# Patient Record
Sex: FEMALE | Race: WHITE | NOT HISPANIC OR LATINO | Employment: OTHER | URBAN - METROPOLITAN AREA
[De-identification: names, ages, dates, MRNs, and addresses within clinical notes are randomized per-mention and may not be internally consistent; named-entity substitution may affect disease eponyms.]

---

## 2021-10-13 ENCOUNTER — HOSPITAL ENCOUNTER (EMERGENCY)
Facility: CLINIC | Age: 66
Discharge: HOME | End: 2021-10-14
Attending: EMERGENCY MEDICINE | Admitting: EMERGENCY MEDICINE
Payer: MEDICARE

## 2021-10-13 DIAGNOSIS — J20.9 ACUTE BRONCHITIS, UNSPECIFIED ORGANISM: ICD-10-CM

## 2021-10-13 DIAGNOSIS — E87.6 HYPOKALEMIA: ICD-10-CM

## 2021-10-13 DIAGNOSIS — I10 HYPERTENSION, UNSPECIFIED TYPE: ICD-10-CM

## 2021-10-13 PROCEDURE — 99285 EMERGENCY DEPT VISIT HI MDM: CPT | Mod: 25

## 2021-10-13 PROCEDURE — 96365 THER/PROPH/DIAG IV INF INIT: CPT

## 2021-10-13 PROCEDURE — 93005 ELECTROCARDIOGRAM TRACING: CPT

## 2021-10-13 PROCEDURE — 96361 HYDRATE IV INFUSION ADD-ON: CPT

## 2021-10-13 RX ORDER — SODIUM CHLORIDE 9 MG/ML
INJECTION, SOLUTION INTRAVENOUS CONTINUOUS
Status: DISCONTINUED | OUTPATIENT
Start: 2021-10-14 | End: 2021-10-14 | Stop reason: HOSPADM

## 2021-10-14 ENCOUNTER — APPOINTMENT (OUTPATIENT)
Dept: GENERAL RADIOLOGY | Facility: CLINIC | Age: 66
End: 2021-10-14
Attending: EMERGENCY MEDICINE
Payer: MEDICARE

## 2021-10-14 VITALS
HEART RATE: 85 BPM | DIASTOLIC BLOOD PRESSURE: 79 MMHG | OXYGEN SATURATION: 97 % | SYSTOLIC BLOOD PRESSURE: 196 MMHG | RESPIRATION RATE: 17 BRPM | TEMPERATURE: 97 F

## 2021-10-14 LAB
ANION GAP SERPL CALCULATED.3IONS-SCNC: 6 MMOL/L (ref 3–14)
ATRIAL RATE - MUSE: 83 BPM
BASOPHILS # BLD AUTO: 0 10E3/UL (ref 0–0.2)
BASOPHILS NFR BLD AUTO: 0 %
BUN SERPL-MCNC: 12 MG/DL (ref 7–30)
CALCIUM SERPL-MCNC: 8.9 MG/DL (ref 8.5–10.1)
CHLORIDE BLD-SCNC: 102 MMOL/L (ref 94–109)
CO2 SERPL-SCNC: 29 MMOL/L (ref 20–32)
CREAT SERPL-MCNC: 0.92 MG/DL (ref 0.52–1.04)
D DIMER PPP FEU-MCNC: 0.35 UG/ML FEU (ref 0–0.5)
DIASTOLIC BLOOD PRESSURE - MUSE: NORMAL MMHG
EOSINOPHIL # BLD AUTO: 0.1 10E3/UL (ref 0–0.7)
EOSINOPHIL NFR BLD AUTO: 1 %
ERYTHROCYTE [DISTWIDTH] IN BLOOD BY AUTOMATED COUNT: 11.9 % (ref 10–15)
GFR SERPL CREATININE-BSD FRML MDRD: 66 ML/MIN/1.73M2
GLUCOSE BLD-MCNC: 132 MG/DL (ref 70–99)
HCT VFR BLD AUTO: 39.5 % (ref 35–47)
HGB BLD-MCNC: 12.9 G/DL (ref 11.7–15.7)
IMM GRANULOCYTES # BLD: 0 10E3/UL
IMM GRANULOCYTES NFR BLD: 0 %
INTERPRETATION ECG - MUSE: NORMAL
LYMPHOCYTES # BLD AUTO: 1.7 10E3/UL (ref 0.8–5.3)
LYMPHOCYTES NFR BLD AUTO: 23 %
MCH RBC QN AUTO: 32.3 PG (ref 26.5–33)
MCHC RBC AUTO-ENTMCNC: 32.7 G/DL (ref 31.5–36.5)
MCV RBC AUTO: 99 FL (ref 78–100)
MONOCYTES # BLD AUTO: 0.7 10E3/UL (ref 0–1.3)
MONOCYTES NFR BLD AUTO: 9 %
NEUTROPHILS # BLD AUTO: 5.1 10E3/UL (ref 1.6–8.3)
NEUTROPHILS NFR BLD AUTO: 67 %
NRBC # BLD AUTO: 0 10E3/UL
NRBC BLD AUTO-RTO: 0 /100
P AXIS - MUSE: 50 DEGREES
PLATELET # BLD AUTO: 153 10E3/UL (ref 150–450)
POTASSIUM BLD-SCNC: 2.8 MMOL/L (ref 3.4–5.3)
PR INTERVAL - MUSE: 140 MS
QRS DURATION - MUSE: 88 MS
QT - MUSE: 376 MS
QTC - MUSE: 441 MS
R AXIS - MUSE: 64 DEGREES
RBC # BLD AUTO: 4 10E6/UL (ref 3.8–5.2)
SODIUM SERPL-SCNC: 137 MMOL/L (ref 133–144)
SYSTOLIC BLOOD PRESSURE - MUSE: NORMAL MMHG
T AXIS - MUSE: 40 DEGREES
TROPONIN I SERPL-MCNC: <0.015 UG/L (ref 0–0.04)
VENTRICULAR RATE- MUSE: 83 BPM
WBC # BLD AUTO: 7.7 10E3/UL (ref 4–11)

## 2021-10-14 PROCEDURE — 84484 ASSAY OF TROPONIN QUANT: CPT | Performed by: EMERGENCY MEDICINE

## 2021-10-14 PROCEDURE — 71045 X-RAY EXAM CHEST 1 VIEW: CPT

## 2021-10-14 PROCEDURE — 85025 COMPLETE CBC W/AUTO DIFF WBC: CPT | Performed by: EMERGENCY MEDICINE

## 2021-10-14 PROCEDURE — 258N000003 HC RX IP 258 OP 636: Performed by: EMERGENCY MEDICINE

## 2021-10-14 PROCEDURE — 250N000013 HC RX MED GY IP 250 OP 250 PS 637: Performed by: EMERGENCY MEDICINE

## 2021-10-14 PROCEDURE — 85379 FIBRIN DEGRADATION QUANT: CPT | Performed by: EMERGENCY MEDICINE

## 2021-10-14 PROCEDURE — 36415 COLL VENOUS BLD VENIPUNCTURE: CPT | Performed by: EMERGENCY MEDICINE

## 2021-10-14 PROCEDURE — 80048 BASIC METABOLIC PNL TOTAL CA: CPT | Performed by: EMERGENCY MEDICINE

## 2021-10-14 PROCEDURE — 250N000011 HC RX IP 250 OP 636: Performed by: EMERGENCY MEDICINE

## 2021-10-14 RX ORDER — POTASSIUM CHLORIDE 1500 MG/1
40 TABLET, EXTENDED RELEASE ORAL ONCE
Status: COMPLETED | OUTPATIENT
Start: 2021-10-14 | End: 2021-10-14

## 2021-10-14 RX ORDER — POTASSIUM CHLORIDE 7.45 MG/ML
10 INJECTION INTRAVENOUS ONCE
Status: COMPLETED | OUTPATIENT
Start: 2021-10-14 | End: 2021-10-14

## 2021-10-14 RX ORDER — POTASSIUM CHLORIDE 1500 MG/1
20 TABLET, EXTENDED RELEASE ORAL DAILY
Qty: 5 TABLET | Refills: 0 | Status: SHIPPED | OUTPATIENT
Start: 2021-10-14 | End: 2021-10-19

## 2021-10-14 RX ADMIN — POTASSIUM CHLORIDE 10 MEQ: 7.46 INJECTION, SOLUTION INTRAVENOUS at 01:00

## 2021-10-14 RX ADMIN — SODIUM CHLORIDE 1000 ML: 9 INJECTION, SOLUTION INTRAVENOUS at 00:15

## 2021-10-14 RX ADMIN — POTASSIUM CHLORIDE 40 MEQ: 1500 TABLET, EXTENDED RELEASE ORAL at 01:01

## 2021-10-14 ASSESSMENT — ENCOUNTER SYMPTOMS
NAUSEA: 0
VOMITING: 0
DIARRHEA: 0
COUGH: 1
SORE THROAT: 0
APPETITE CHANGE: 1
SHORTNESS OF BREATH: 0
FATIGUE: 1
MYALGIAS: 0
HEADACHES: 0

## 2021-10-14 NOTE — ED TRIAGE NOTES
Pt aox4, ABCs intact. Pt c/o cough, fatigue, and chills x5 days. Sent from urgent care for further work up. Pt also c/o chest pain and intermittent shortness of breath.

## 2021-10-14 NOTE — ED PROVIDER NOTES
"  History     Chief Complaint:  Fatigue and Cough      HPI   Lydia Johnson is a 65 year old female who presents with concerns for cough since last Tuesday that is intermittently productive and nonproductive, unknown sputum color, associated with initial chest pain only with coughing and now constant chest pain that she describes as \"irritated\".  She denies hemoptysis.  Denies shortness of breath aside from with coughing.  Denies leg swelling or pain.  She denies fever or chills.  She denies headache, sore throat, body aches. She notes she is fatigued than normal. Reports that she has had decreased oral intake but has been trying to drink plenty of fluids.  Denies nausea, vomiting or diarrhea.  She notes her granddaughter and her  were sick with similar symptoms.  She notes her  tested negative for Covid.  She had the Covid vaccine, last dose in March.  She notes she went to urgent care and they sent her here for further assessment.    Review of Systems   Constitutional: Positive for appetite change and fatigue.   HENT: Negative for sore throat.    Respiratory: Positive for cough. Negative for shortness of breath.    Cardiovascular: Positive for chest pain.   Gastrointestinal: Negative for diarrhea, nausea and vomiting.   Musculoskeletal: Negative for myalgias.   Neurological: Negative for headaches.   All other systems reviewed and are negative.      Allergies:  No Known Drug Allergies    Medications:    Tenormin  Hyzaar  Lipitor   Prilosec   Airduo    Past Medical History:    Hypertension     Past Surgical History:      Tubal ligation   Tonsillectomy    Family History:    Family history reviewed. No pertinent family history.     Social History:  The patient presents alone.     Physical Exam     Patient Vitals for the past 24 hrs:   BP Temp Temp src Pulse Resp SpO2   10/14/21 0119 -- -- -- 85 17 97 %   10/13/21 2027 (!) 196/79 97  F (36.1  C) Temporal 86 24 95 %       Physical " Exam  General: Elderly female sitting upright  Eyes: PERRL, Conjunctive within normal limits  ENT: Moist mucous membranes, oropharynx clear.   CV: Normal S1S2, no murmur, rub or gallop. Regular rate and rhythm  Resp: Clear to auscultation bilaterally, no wheezes, rales or rhonchi. Normal respiratory effort.  GI: Abdomen is soft, nontender and nondistended. No palpable masses. No rebound or guarding.  MSK: No edema. Nontender. Normal active range of motion.  Skin: Warm and dry. No rashes or lesions or ecchymoses on visible skin.  Neuro: Alert and oriented. Responds appropriately to all questions and commands. No focal findings appreciated. Normal muscle tone.  Psych: Normal mood and affect.     Emergency Department Course   ECG:  ECG taken at 2352, ECG read at 0001  Normal sinus rhythm   Nonspecific ST abnomality   Rate 83 bpm. NY interval 140 ms. QRS duration 88 ms. QT/QTc 376/441 ms. P-R-T axes 50 64 40.     Imaging:  XR Chest Port 1 View   Final Result   IMPRESSION: Heart size is normal. Lungs are clear. No visible pneumothorax or pleural effusion. Mild acromioclavicular degenerative spurring.          Laboratory:  Labs Ordered and Resulted from Time of ED Arrival Up to the Time of Departure from the ED   BASIC METABOLIC PANEL - Abnormal; Notable for the following components:       Result Value    Potassium 2.8 (*)     Glucose 132 (*)     All other components within normal limits   TROPONIN I - Normal   D DIMER QUANTITATIVE - Normal    Narrative:     This D-dimer assay is intended for use in conjunction with a clinical pretest probability assessment model to exclude pulmonary embolism (PE) and deep venous thrombosis (DVT) in outpatients suspected of PE or DVT. The cut-off value is 0.50 ug/mL FEU.   CBC WITH PLATELETS AND DIFFERENTIAL   COVID-19 VIRUS (CORONAVIRUS) BY PCR   PERIPHERAL IV CATHETER   CARDIAC CONTINUOUS MONITORING   PULSE OXIMETRY NURSING   CBC WITH PLATELETS & DIFFERENTIAL    Narrative:     The  following orders were created for panel order CBC with platelets differential.  Procedure                               Abnormality         Status                     ---------                               -----------         ------                     CBC with platelets and d...[[id number]]                      Final result                 Please view results for these tests on the individual orders.       Emergency Department Course:    Reviewed:  I reviewed nursing notes, vitals, past history and care everywhere    Assessments:  2345 I obtained history and examined the patient as noted above.     0126 I rechecked the patient and explained findings.     Interventions:  Medications   0.9% sodium chloride BOLUS (1,000 mLs Intravenous New Bag 10/14/21 0015)     Followed by   sodium chloride 0.9% infusion (has no administration in time range)   potassium chloride 10 mEq in 100 mL sterile water intermittent infusion (premix) (10 mEq Intravenous New Bag 10/14/21 0100)   potassium chloride ER (KLOR-CON M) CR tablet 40 mEq (40 mEq Oral Given 10/14/21 0101)       Disposition:  The patient was discharged to home.    Impression & Plan      Medical Decision Making:    Lydia Johnson is a 65 year old female who presents for evaluation of cough and fatigue.  There is no hypoxia. This is consistent by clinical exam with acute bronchitis. This is likely viral given the history and presentation as well as duration. She has no evidence of pneumonia by clinical examination or chest x-ray. She has no clinical evidence of ACS, PE, pneumothorax, pleural effusion,  etc. There is no indication antibiotics or hospitalization. She is overall well appearance. She did have incidentally noted hypokalemia which may be secondary to medications and poor oral intake. She was given both IV and p.o. supplementation. She was given IV fluids. She is feeling comfortable on reassessment. I discussed the plan which includes close follow-up with her  PCP within 3 to 5 days. She understands that if symptoms become prolonged or she develops new fever, shortness of breath, etc. she should get reassessed and/or return to the emergency department. Short course of potassium supplement prescribed, any further treatment as recommended on reassessment by PCP. She feels comfortable this plan is discharged home in stable condition.    Covid-19  Lydia Johnson was evaluated during a global COVID-19 pandemic, which necessitated consideration that the patient might be at risk for infection with the SARS-CoV-2 virus that causes COVID-19.   Applicable protocols for evaluation were followed during the patient's care.   COVID-19 was considered as part of the patient's evaluation. The plan for testing is:  a test was obtained during this visit.    Diagnosis:    ICD-10-CM    1. Acute bronchitis, unspecified organism  J20.9    2. Hypokalemia  E87.6        Scribe Disclosure:  I, Joanne Amaya, am serving as a scribe at 11:39 PM on 10/13/2021 to document services personally performed by Rula Noe MD based on my observations and the provider's statements to me.      Rula Noe MD  10/14/21 0147

## 2023-07-25 ENCOUNTER — HOSPITAL ENCOUNTER (EMERGENCY)
Facility: CLINIC | Age: 68
Discharge: HOME | End: 2023-07-25
Attending: EMERGENCY MEDICINE | Admitting: EMERGENCY MEDICINE
Payer: MEDICARE

## 2023-07-25 ENCOUNTER — APPOINTMENT (OUTPATIENT)
Dept: GENERAL RADIOLOGY | Facility: CLINIC | Age: 68
End: 2023-07-25
Attending: EMERGENCY MEDICINE
Payer: MEDICARE

## 2023-07-25 VITALS
WEIGHT: 163 LBS | DIASTOLIC BLOOD PRESSURE: 57 MMHG | SYSTOLIC BLOOD PRESSURE: 138 MMHG | BODY MASS INDEX: 25.58 KG/M2 | RESPIRATION RATE: 18 BRPM | TEMPERATURE: 97.2 F | HEART RATE: 40 BPM | HEIGHT: 67 IN | OXYGEN SATURATION: 95 %

## 2023-07-25 DIAGNOSIS — R07.89 CHEST WALL PAIN: ICD-10-CM

## 2023-07-25 DIAGNOSIS — R00.1 SYMPTOMATIC BRADYCARDIA: ICD-10-CM

## 2023-07-25 LAB
ANION GAP SERPL CALCULATED.3IONS-SCNC: 9 MMOL/L (ref 7–15)
ATRIAL RATE - MUSE: 48 BPM
BASOPHILS # BLD AUTO: 0.1 10E3/UL (ref 0–0.2)
BASOPHILS NFR BLD AUTO: 1 %
BUN SERPL-MCNC: 16.6 MG/DL (ref 8–23)
CALCIUM SERPL-MCNC: 9.7 MG/DL (ref 8.8–10.2)
CHLORIDE SERPL-SCNC: 102 MMOL/L (ref 98–107)
CREAT SERPL-MCNC: 0.97 MG/DL (ref 0.51–0.95)
D DIMER PPP FEU-MCNC: 0.3 UG/ML FEU (ref 0–0.5)
DEPRECATED HCO3 PLAS-SCNC: 29 MMOL/L (ref 22–29)
DIASTOLIC BLOOD PRESSURE - MUSE: NORMAL MMHG
EOSINOPHIL # BLD AUTO: 0.1 10E3/UL (ref 0–0.7)
EOSINOPHIL NFR BLD AUTO: 2 %
ERYTHROCYTE [DISTWIDTH] IN BLOOD BY AUTOMATED COUNT: 12.1 % (ref 10–15)
GFR SERPL CREATININE-BSD FRML MDRD: 64 ML/MIN/1.73M2
GLUCOSE SERPL-MCNC: 114 MG/DL (ref 70–99)
HCT VFR BLD AUTO: 42.8 % (ref 35–47)
HGB BLD-MCNC: 14.1 G/DL (ref 11.7–15.7)
HOLD SPECIMEN: NORMAL
HOLD SPECIMEN: NORMAL
IMM GRANULOCYTES # BLD: 0 10E3/UL
IMM GRANULOCYTES NFR BLD: 0 %
INTERPRETATION ECG - MUSE: NORMAL
LYMPHOCYTES # BLD AUTO: 1.8 10E3/UL (ref 0.8–5.3)
LYMPHOCYTES NFR BLD AUTO: 33 %
MAGNESIUM SERPL-MCNC: 1.9 MG/DL (ref 1.7–2.3)
MCH RBC QN AUTO: 32.2 PG (ref 26.5–33)
MCHC RBC AUTO-ENTMCNC: 32.9 G/DL (ref 31.5–36.5)
MCV RBC AUTO: 98 FL (ref 78–100)
MONOCYTES # BLD AUTO: 0.4 10E3/UL (ref 0–1.3)
MONOCYTES NFR BLD AUTO: 7 %
NEUTROPHILS # BLD AUTO: 3.1 10E3/UL (ref 1.6–8.3)
NEUTROPHILS NFR BLD AUTO: 57 %
NRBC # BLD AUTO: 0 10E3/UL
NRBC BLD AUTO-RTO: 0 /100
P AXIS - MUSE: 64 DEGREES
PLATELET # BLD AUTO: 166 10E3/UL (ref 150–450)
POTASSIUM SERPL-SCNC: 4.3 MMOL/L (ref 3.4–5.3)
PR INTERVAL - MUSE: 158 MS
QRS DURATION - MUSE: 84 MS
QT - MUSE: 428 MS
QTC - MUSE: 382 MS
R AXIS - MUSE: 75 DEGREES
RBC # BLD AUTO: 4.38 10E6/UL (ref 3.8–5.2)
SODIUM SERPL-SCNC: 140 MMOL/L (ref 136–145)
SYSTOLIC BLOOD PRESSURE - MUSE: NORMAL MMHG
T AXIS - MUSE: 52 DEGREES
TROPONIN T SERPL HS-MCNC: <6 NG/L
TROPONIN T SERPL HS-MCNC: <6 NG/L
TSH SERPL DL<=0.005 MIU/L-ACNC: 2.46 UIU/ML (ref 0.3–4.2)
VENTRICULAR RATE- MUSE: 48 BPM
WBC # BLD AUTO: 5.4 10E3/UL (ref 4–11)

## 2023-07-25 PROCEDURE — 84443 ASSAY THYROID STIM HORMONE: CPT | Performed by: EMERGENCY MEDICINE

## 2023-07-25 PROCEDURE — 71046 X-RAY EXAM CHEST 2 VIEWS: CPT

## 2023-07-25 PROCEDURE — 99285 EMERGENCY DEPT VISIT HI MDM: CPT | Mod: 25

## 2023-07-25 PROCEDURE — 96374 THER/PROPH/DIAG INJ IV PUSH: CPT

## 2023-07-25 PROCEDURE — 36415 COLL VENOUS BLD VENIPUNCTURE: CPT | Performed by: EMERGENCY MEDICINE

## 2023-07-25 PROCEDURE — 85379 FIBRIN DEGRADATION QUANT: CPT | Performed by: EMERGENCY MEDICINE

## 2023-07-25 PROCEDURE — 80048 BASIC METABOLIC PNL TOTAL CA: CPT | Performed by: EMERGENCY MEDICINE

## 2023-07-25 PROCEDURE — 93005 ELECTROCARDIOGRAM TRACING: CPT

## 2023-07-25 PROCEDURE — 83735 ASSAY OF MAGNESIUM: CPT | Performed by: EMERGENCY MEDICINE

## 2023-07-25 PROCEDURE — 85025 COMPLETE CBC W/AUTO DIFF WBC: CPT | Performed by: EMERGENCY MEDICINE

## 2023-07-25 PROCEDURE — 84484 ASSAY OF TROPONIN QUANT: CPT | Performed by: EMERGENCY MEDICINE

## 2023-07-25 PROCEDURE — 250N000011 HC RX IP 250 OP 636: Mod: JZ | Performed by: EMERGENCY MEDICINE

## 2023-07-25 RX ORDER — ATROPINE SULFATE 0.1 MG/ML
0.5 INJECTION INTRAVENOUS
Status: DISCONTINUED | OUTPATIENT
Start: 2023-07-25 | End: 2023-07-25 | Stop reason: HOSPADM

## 2023-07-25 RX ORDER — KETOROLAC TROMETHAMINE 15 MG/ML
15 INJECTION, SOLUTION INTRAMUSCULAR; INTRAVENOUS ONCE
Status: COMPLETED | OUTPATIENT
Start: 2023-07-25 | End: 2023-07-25

## 2023-07-25 RX ORDER — ONDANSETRON 2 MG/ML
4 INJECTION INTRAMUSCULAR; INTRAVENOUS ONCE
Status: DISCONTINUED | OUTPATIENT
Start: 2023-07-25 | End: 2023-07-25 | Stop reason: HOSPADM

## 2023-07-25 RX ADMIN — KETOROLAC TROMETHAMINE 15 MG: 15 INJECTION, SOLUTION INTRAMUSCULAR; INTRAVENOUS at 13:11

## 2023-07-25 ASSESSMENT — ACTIVITIES OF DAILY LIVING (ADL)
ADLS_ACUITY_SCORE: 35
ADLS_ACUITY_SCORE: 35

## 2023-07-25 NOTE — ED PROVIDER NOTES
History     Chief Complaint:  Chest Pain     The history is provided by the patient.      Lydia Johnson is a 67 year old female with a history of hypertension who presents with chest pain under her left breast which started 6 hours ago. She describes the pain as a pressure which is a 7-8/10 in the ED. He notes it woke her up out of her sleep. She has associated nausea and lightheadedness, restless upper arms, and dyspnea as well as shortness of breath when she went up the stairs. She denies vomiting, diaphoresis, leg swelling, back pain, or radiation of pain into arms or abdomen. No personal or family history of blood clots in legs or lungs. No history of thyroid problems. She notes she took 4 baby aspirins this morning. She notes she drinks alcohol but denies smoking. Family history of heart attack in father and sister. No recent medication changes. Per triage note, the patient had a recent 4 hour care ride.     Independent Historian:   None - Patient Only    Review of External Notes:   See MDM.      Medications:    Losartan-hydrochlorothiazide  Magnesium Glycinate  Atenolol  Atorvastatin  Estradiol    Past Medical History:    Adenomatous colon polyp  Postmenopausal hormone replacement therapy   Trophy of vulva  Symptomatic menopausal or female climacteric states   Allergic rhinitis  Essential hypertension   Pneumonia  Chronic cough   Pulmonary nodule  Mild asthma    Past Surgical History:     section  Tubal ligation  [place]er teeth extraction  Tonsillectomy      Physical Exam   Patient Vitals for the past 24 hrs:   BP Temp Pulse Resp SpO2 Height Weight   23 1231 -- -- (!) 40 12 98 % -- --   23 1222 (!) 148/59 -- (!) 41 28 97 % -- --   23 1207 (!) 141/68 -- (!) 41 18 97 % -- --   23 1156 -- -- (!) 40 -- 98 % -- --   23 1154 -- -- (!) 38 11 96 % -- --   23 1139 -- -- (!) 40 10 97 % -- --   23 1124 -- -- (!) 41 26 98 % -- --   23 1109 -- -- (!) 41 14 95 %  "-- --   07/25/23 1044 -- -- 50 21 99 % -- --   07/25/23 1037 (!) 178/72 -- (!) 41 10 97 % -- --   07/25/23 1022 (!) 173/64 -- (!) 40 20 98 % -- --   07/25/23 1002 (!) 189/75 -- (!) 44 19 98 % -- --   07/25/23 0939 (!) 207/70 97.2  F (36.2  C) (!) 48 16 96 % 1.702 m (5' 7\") 73.9 kg (163 lb)      Physical Exam  Constitutional: Well developed, nontox appearance  Head: Atraumatic.   Mouth/Throat: Oropharynx is clear and moist.Neck:  no stridor  Eyes: no scleral icterus  Cardiovascular: Regular bradycardia, 2+ bilat radial pulses  Pulmonary/Chest: nml resp effort, Clear BS bilat, equivocal chest tenderness  Abdominal: ND, soft, NT, no rebound or guarding   Ext: Warm, well perfused, no edema  Neurological: A&O, symmetric facies, moves ext x4  Skin: Skin is warm and dry.   Psychiatric: Behavior is normal. Thought content normal.   Nursing note and vitals reviewed.     Emergency Department Course   ECG  ECG taken at 0935, ECG read at 0935  Sinus bradycardia   No significant change as compared to prior, dated 10/13/2021.  Rate 48 bpm. LA interval 158 ms. QRS duration 84 ms. QT/QTc 428/382 ms. P-R-T axes 64 75 52.     Imaging:  XR Chest 2 Views   Final Result   IMPRESSION: PA and lateral views of the chest were obtained.   Cardiomediastinal silhouette is within normal limits. No suspicious   focal pulmonary opacities. No significant pleural effusion or   pneumothorax.      WISAM CHANDLER MD            SYSTEM ID:  [id number]         Report per radiology    Laboratory:  Labs Ordered and Resulted from Time of ED Arrival to Time of ED Departure   BASIC METABOLIC PANEL - Abnormal       Result Value    Sodium 140      Potassium 4.3      Chloride 102      Carbon Dioxide (CO2) 29      Anion Gap 9      Urea Nitrogen 16.6      Creatinine 0.97 (*)     Calcium 9.7      Glucose 114 (*)     GFR Estimate 64     TROPONIN T, HIGH SENSITIVITY - Normal    Troponin T, High Sensitivity <6     MAGNESIUM - Normal    Magnesium 1.9     TSH WITH " FREE T4 REFLEX - Normal    TSH 2.46     D DIMER QUANTITATIVE - Normal    D-Dimer Quantitative 0.30     TROPONIN T, HIGH SENSITIVITY - Normal    Troponin T, High Sensitivity <6     CBC WITH PLATELETS AND DIFFERENTIAL    WBC Count 5.4      RBC Count 4.38      Hemoglobin 14.1      Hematocrit 42.8      MCV 98      MCH 32.2      MCHC 32.9      RDW 12.1      Platelet Count 166      % Neutrophils 57      % Lymphocytes 33      % Monocytes 7      % Eosinophils 2      % Basophils 1      % Immature Granulocytes 0      NRBCs per 100 WBC 0      Absolute Neutrophils 3.1      Absolute Lymphocytes 1.8      Absolute Monocytes 0.4      Absolute Eosinophils 0.1      Absolute Basophils 0.1      Absolute Immature Granulocytes 0.0      Absolute NRBCs 0.0       Emergency Department Course & Assessments:    Interventions:  Medications   atropine injection 0.5 mg (has no administration in time range)   ondansetron (ZOFRAN) injection 4 mg (has no administration in time range)   ketorolac (TORADOL) injection 15 mg (15 mg Intravenous $Given 23 1311)      Independent Interpretation (X-rays, CTs, rhythm strip):  See MDM.     Assessments/Consultations/Discussion of Management or Tests:  ED Course as of 23 1314   Tue 2023   1010 I obtained the patient's history and examined as noted above.    1240 I rechecked the patient and explained findings.    1305 I rechecked the patient.      Social Determinants of Health affecting care:   See MDM.     Disposition:  The patient was discharged to home.     Impression & Plan    CMS Diagnoses: None      Medical Decision Makin year old female presenting w/ chest discomfort     Social determinants affecting patient's health include: Age potentially increasing risk for presentation to the emergency department     I reviewed medical records from family medicine office visit with heart rates in the 40s from 8/3/2022     DDx includes ACS, unstable angina, chest wall pain, GERD, pneumothorax,  pneumonia, symptomatic bradycardia, PE, some thyroid dysfunction.  EKG interpretation as noted above sig for no acute ischemic findings on my independent interpretation, sinus bradycardia noted.  Labs and imaging ordered as noted above.  Labs significant for troponin undetectable x2, D-dimer within normal limits, TSH within normal limits.  Imaging sig for no acute cardiopulmonary disease on my independent interpretation.  Patient was noted to be hypertensive early in her emergency department stay which gradually improved without intervention.  She reports overall feeling improved.  Patient remains bradycardic but this does not appear new after review of previous office visits.  She again notes that the pain is worse with deep breaths and movement.  Seems most consistent with chest wall pain such as a muscle strain or costochondritis.  Doubt ACS, PE, dissection therefore I do not feel further outpatient work-up is indicated at this time including a stress test.  Toradol given in the emergency department prior to discharge with recommendations given regarding symptom control at home.  At this time I feel the patient is safe for discharge.  Recommendations given regarding follow up with PCP and return to the emergency department as needed for new or worsening symptoms.  Pt counseled on all results, diagnosis and disposition.  They are understanding and agreeable to plan. Patient discharged in stable condition.    .    Diagnosis:    ICD-10-CM    1. Chest wall pain  R07.89       2. Symptomatic bradycardia  R00.1          Scribe Disclosure:  I, Makeda Macedo, am serving as a scribe at 9:57 AM on 7/25/2023 to document services personally performed by Eligio Gipson MD based on my observations and the provider's statements to me.      7/25/2023   Eligio Gipson MD Vaughn, Christopher E, MD  07/25/23 [id number]

## 2023-07-25 NOTE — DISCHARGE INSTRUCTIONS
Please return to the emergency department as needed for new or worsening symptoms such as fainting, severe shortness of breath, vomiting unable to keep anything down, any other concerning symptoms.

## 2023-07-25 NOTE — ED TRIAGE NOTES
Pt sent from  for chest pain left side that woke her up from sleep around 0400 this morning with SOB.  [place] No history of clots.     Triage Assessment     Row Name 07/25/23 0938       Triage Assessment (Adult)    Airway WDL WDL       Respiratory WDL    Respiratory WDL WDL       Skin Circulation/Temperature WDL    Skin Circulation/Temperature WDL WDL       Cardiac WDL    Cardiac WDL chest pain

## 2024-03-30 ENCOUNTER — APPOINTMENT (OUTPATIENT)
Dept: CARDIOLOGY | Facility: CLINIC | Age: 69
DRG: 193 | End: 2024-03-30
Attending: INTERNAL MEDICINE
Payer: MEDICARE

## 2024-03-30 ENCOUNTER — HOSPITAL ENCOUNTER (INPATIENT)
Facility: CLINIC | Age: 69
LOS: 6 days | Discharge: HOME | DRG: 193 | End: 2024-04-05
Attending: EMERGENCY MEDICINE | Admitting: INTERNAL MEDICINE
Payer: MEDICARE

## 2024-03-30 ENCOUNTER — APPOINTMENT (OUTPATIENT)
Dept: GENERAL RADIOLOGY | Facility: CLINIC | Age: 69
DRG: 193 | End: 2024-03-30
Attending: EMERGENCY MEDICINE
Payer: MEDICARE

## 2024-03-30 ENCOUNTER — APPOINTMENT (OUTPATIENT)
Dept: CT IMAGING | Facility: CLINIC | Age: 69
DRG: 193 | End: 2024-03-30
Attending: EMERGENCY MEDICINE
Payer: MEDICARE

## 2024-03-30 ENCOUNTER — APPOINTMENT (OUTPATIENT)
Dept: SPEECH THERAPY | Facility: CLINIC | Age: 69
DRG: 193 | End: 2024-03-30
Attending: HOSPITALIST
Payer: MEDICARE

## 2024-03-30 DIAGNOSIS — R06.09 DOE (DYSPNEA ON EXERTION): Primary | ICD-10-CM

## 2024-03-30 DIAGNOSIS — J44.9 CHRONIC OBSTRUCTIVE PULMONARY DISEASE, UNSPECIFIED COPD TYPE (H): ICD-10-CM

## 2024-03-30 DIAGNOSIS — R13.19 ESOPHAGEAL DYSPHAGIA: ICD-10-CM

## 2024-03-30 DIAGNOSIS — J18.9 UNRESOLVED PNEUMONIA: ICD-10-CM

## 2024-03-30 DIAGNOSIS — J96.01 ACUTE RESPIRATORY FAILURE WITH HYPOXIA (H): ICD-10-CM

## 2024-03-30 PROBLEM — E78.5 DYSLIPIDEMIA: Chronic | Status: ACTIVE | Noted: 2024-03-30

## 2024-03-30 PROBLEM — R09.02 HYPOXIA: Status: ACTIVE | Noted: 2024-03-30

## 2024-03-30 PROBLEM — D72.825 BANDEMIA: Status: ACTIVE | Noted: 2024-03-30

## 2024-03-30 LAB
ALBUMIN SERPL BCG-MCNC: 4 G/DL (ref 3.5–5.2)
ALP SERPL-CCNC: 96 U/L (ref 40–150)
ALT SERPL W P-5'-P-CCNC: 20 U/L (ref 0–50)
ANION GAP SERPL CALCULATED.3IONS-SCNC: 13 MMOL/L (ref 7–15)
AST SERPL W P-5'-P-CCNC: 21 U/L (ref 0–45)
BASOPHILS # BLD AUTO: 0.1 10E3/UL (ref 0–0.2)
BASOPHILS NFR BLD AUTO: 0 %
BILIRUB SERPL-MCNC: 0.9 MG/DL
BUN SERPL-MCNC: 21.9 MG/DL (ref 8–23)
CALCIUM SERPL-MCNC: 9.1 MG/DL (ref 8.8–10.2)
CHLORIDE SERPL-SCNC: 98 MMOL/L (ref 98–107)
CREAT SERPL-MCNC: 1.16 MG/DL (ref 0.51–0.95)
D DIMER PPP FEU-MCNC: 0.4 UG/ML FEU (ref 0–0.5)
DEPRECATED HCO3 PLAS-SCNC: 27 MMOL/L (ref 22–29)
EGFRCR SERPLBLD CKD-EPI 2021: 51 ML/MIN/1.73M2
EOSINOPHIL # BLD AUTO: 0.1 10E3/UL (ref 0–0.7)
EOSINOPHIL NFR BLD AUTO: 0 %
ERYTHROCYTE [DISTWIDTH] IN BLOOD BY AUTOMATED COUNT: 12.2 % (ref 10–15)
FLUAV RNA SPEC QL NAA+PROBE: NEGATIVE
FLUBV RNA RESP QL NAA+PROBE: NEGATIVE
GLUCOSE SERPL-MCNC: 119 MG/DL (ref 70–99)
HCT VFR BLD AUTO: 41.4 % (ref 35–47)
HGB BLD-MCNC: 13.7 G/DL (ref 11.7–15.7)
HOLD SPECIMEN: NORMAL
IMM GRANULOCYTES # BLD: 0.1 10E3/UL
IMM GRANULOCYTES NFR BLD: 1 %
LVEF ECHO: NORMAL
LYMPHOCYTES # BLD AUTO: 1.9 10E3/UL (ref 0.8–5.3)
LYMPHOCYTES NFR BLD AUTO: 10 %
MCH RBC QN AUTO: 31.4 PG (ref 26.5–33)
MCHC RBC AUTO-ENTMCNC: 33.1 G/DL (ref 31.5–36.5)
MCV RBC AUTO: 95 FL (ref 78–100)
MONOCYTES # BLD AUTO: 0.9 10E3/UL (ref 0–1.3)
MONOCYTES NFR BLD AUTO: 5 %
NEUTROPHILS # BLD AUTO: 16.7 10E3/UL (ref 1.6–8.3)
NEUTROPHILS NFR BLD AUTO: 84 %
NRBC # BLD AUTO: 0 10E3/UL
NRBC BLD AUTO-RTO: 0 /100
PLATELET # BLD AUTO: 165 10E3/UL (ref 150–450)
POTASSIUM SERPL-SCNC: 3.5 MMOL/L (ref 3.4–5.3)
PROT SERPL-MCNC: 7.1 G/DL (ref 6.4–8.3)
RBC # BLD AUTO: 4.36 10E6/UL (ref 3.8–5.2)
RSV RNA SPEC NAA+PROBE: NEGATIVE
SARS-COV-2 RNA RESP QL NAA+PROBE: NEGATIVE
SODIUM SERPL-SCNC: 138 MMOL/L (ref 135–145)
TROPONIN T SERPL HS-MCNC: 6 NG/L
WBC # BLD AUTO: 19.8 10E3/UL (ref 4–11)

## 2024-03-30 PROCEDURE — 99223 1ST HOSP IP/OBS HIGH 75: CPT | Mod: AI | Performed by: INTERNAL MEDICINE

## 2024-03-30 PROCEDURE — 80053 COMPREHEN METABOLIC PANEL: CPT | Performed by: EMERGENCY MEDICINE

## 2024-03-30 PROCEDURE — 99285 EMERGENCY DEPT VISIT HI MDM: CPT | Mod: 25

## 2024-03-30 PROCEDURE — 85025 COMPLETE CBC W/AUTO DIFF WBC: CPT | Performed by: EMERGENCY MEDICINE

## 2024-03-30 PROCEDURE — 93005 ELECTROCARDIOGRAM TRACING: CPT

## 2024-03-30 PROCEDURE — 71250 CT THORAX DX C-: CPT | Mod: MG

## 2024-03-30 PROCEDURE — 250N000011 HC RX IP 250 OP 636: Performed by: EMERGENCY MEDICINE

## 2024-03-30 PROCEDURE — 93306 TTE W/DOPPLER COMPLETE: CPT | Mod: 26 | Performed by: INTERNAL MEDICINE

## 2024-03-30 PROCEDURE — 87637 SARSCOV2&INF A&B&RSV AMP PRB: CPT | Performed by: EMERGENCY MEDICINE

## 2024-03-30 PROCEDURE — 96365 THER/PROPH/DIAG IV INF INIT: CPT

## 2024-03-30 PROCEDURE — 250N000011 HC RX IP 250 OP 636: Performed by: INTERNAL MEDICINE

## 2024-03-30 PROCEDURE — 36415 COLL VENOUS BLD VENIPUNCTURE: CPT | Performed by: EMERGENCY MEDICINE

## 2024-03-30 PROCEDURE — 120N000001 HC R&B MED SURG/OB

## 2024-03-30 PROCEDURE — 92610 EVALUATE SWALLOWING FUNCTION: CPT | Mod: GN

## 2024-03-30 PROCEDURE — 85379 FIBRIN DEGRADATION QUANT: CPT | Performed by: EMERGENCY MEDICINE

## 2024-03-30 PROCEDURE — 71046 X-RAY EXAM CHEST 2 VIEWS: CPT

## 2024-03-30 PROCEDURE — 258N000003 HC RX IP 258 OP 636: Performed by: EMERGENCY MEDICINE

## 2024-03-30 PROCEDURE — 84484 ASSAY OF TROPONIN QUANT: CPT | Performed by: EMERGENCY MEDICINE

## 2024-03-30 PROCEDURE — 93306 TTE W/DOPPLER COMPLETE: CPT

## 2024-03-30 RX ORDER — ACETAMINOPHEN 325 MG/1
650 TABLET ORAL EVERY 4 HOURS PRN
Status: DISCONTINUED | OUTPATIENT
Start: 2024-03-30 | End: 2024-04-05 | Stop reason: HOSPADM

## 2024-03-30 RX ORDER — IPRATROPIUM BROMIDE AND ALBUTEROL SULFATE 2.5; .5 MG/3ML; MG/3ML
3 SOLUTION RESPIRATORY (INHALATION) 4 TIMES DAILY PRN
Status: DISCONTINUED | OUTPATIENT
Start: 2024-03-30 | End: 2024-04-05 | Stop reason: HOSPADM

## 2024-03-30 RX ORDER — AMOXICILLIN 250 MG
1 CAPSULE ORAL 2 TIMES DAILY PRN
Status: DISCONTINUED | OUTPATIENT
Start: 2024-03-30 | End: 2024-04-05 | Stop reason: HOSPADM

## 2024-03-30 RX ORDER — PROCHLORPERAZINE 25 MG
12.5 SUPPOSITORY, RECTAL RECTAL EVERY 12 HOURS PRN
Status: DISCONTINUED | OUTPATIENT
Start: 2024-03-30 | End: 2024-04-05 | Stop reason: HOSPADM

## 2024-03-30 RX ORDER — ATORVASTATIN CALCIUM 10 MG/1
10 TABLET, FILM COATED ORAL DAILY
COMMUNITY

## 2024-03-30 RX ORDER — ATENOLOL 25 MG/1
25 TABLET ORAL 2 TIMES DAILY
COMMUNITY

## 2024-03-30 RX ORDER — LOSARTAN POTASSIUM AND HYDROCHLOROTHIAZIDE 12.5; 5 MG/1; MG/1
1 TABLET ORAL DAILY
COMMUNITY

## 2024-03-30 RX ORDER — LIDOCAINE 40 MG/G
CREAM TOPICAL
Status: DISCONTINUED | OUTPATIENT
Start: 2024-03-30 | End: 2024-04-05 | Stop reason: HOSPADM

## 2024-03-30 RX ORDER — FLUTICASONE PROPIONATE AND SALMETEROL 113; 14 UG/1; UG/1
1 POWDER, METERED RESPIRATORY (INHALATION) 2 TIMES DAILY
COMMUNITY

## 2024-03-30 RX ORDER — AMOXICILLIN 250 MG
2 CAPSULE ORAL 2 TIMES DAILY PRN
Status: DISCONTINUED | OUTPATIENT
Start: 2024-03-30 | End: 2024-04-05 | Stop reason: HOSPADM

## 2024-03-30 RX ORDER — CEFTRIAXONE 1 G/1
1 INJECTION, POWDER, FOR SOLUTION INTRAMUSCULAR; INTRAVENOUS ONCE
Status: COMPLETED | OUTPATIENT
Start: 2024-03-30 | End: 2024-03-30

## 2024-03-30 RX ORDER — FLUTICASONE PROPIONATE 50 MCG
2 SPRAY, SUSPENSION (ML) NASAL DAILY
COMMUNITY

## 2024-03-30 RX ORDER — AZITHROMYCIN 500 MG/5ML
500 INJECTION, POWDER, LYOPHILIZED, FOR SOLUTION INTRAVENOUS ONCE
Status: COMPLETED | OUTPATIENT
Start: 2024-03-30 | End: 2024-03-30

## 2024-03-30 RX ORDER — AZITHROMYCIN 500 MG/5ML
500 INJECTION, POWDER, LYOPHILIZED, FOR SOLUTION INTRAVENOUS EVERY 24 HOURS
Status: DISCONTINUED | OUTPATIENT
Start: 2024-03-30 | End: 2024-03-30

## 2024-03-30 RX ORDER — PROCHLORPERAZINE MALEATE 5 MG
5 TABLET ORAL EVERY 6 HOURS PRN
Status: DISCONTINUED | OUTPATIENT
Start: 2024-03-30 | End: 2024-04-05 | Stop reason: HOSPADM

## 2024-03-30 RX ORDER — CALCIUM CARBONATE 500 MG/1
1000 TABLET, CHEWABLE ORAL 4 TIMES DAILY PRN
Status: DISCONTINUED | OUTPATIENT
Start: 2024-03-30 | End: 2024-04-05 | Stop reason: HOSPADM

## 2024-03-30 RX ORDER — ACETAMINOPHEN 650 MG/1
650 SUPPOSITORY RECTAL EVERY 4 HOURS PRN
Status: DISCONTINUED | OUTPATIENT
Start: 2024-03-30 | End: 2024-04-05 | Stop reason: HOSPADM

## 2024-03-30 RX ORDER — ONDANSETRON 4 MG/1
4 TABLET, ORALLY DISINTEGRATING ORAL EVERY 6 HOURS PRN
Status: DISCONTINUED | OUTPATIENT
Start: 2024-03-30 | End: 2024-04-05 | Stop reason: HOSPADM

## 2024-03-30 RX ORDER — CEFTRIAXONE 2 G/1
2 INJECTION, POWDER, FOR SOLUTION INTRAMUSCULAR; INTRAVENOUS EVERY 24 HOURS
Status: DISCONTINUED | OUTPATIENT
Start: 2024-03-31 | End: 2024-04-04

## 2024-03-30 RX ORDER — AZITHROMYCIN 250 MG/1
250 TABLET, FILM COATED ORAL DAILY
Status: COMPLETED | OUTPATIENT
Start: 2024-03-31 | End: 2024-04-03

## 2024-03-30 RX ORDER — ENOXAPARIN SODIUM 100 MG/ML
40 INJECTION SUBCUTANEOUS EVERY 24 HOURS
Status: DISCONTINUED | OUTPATIENT
Start: 2024-03-30 | End: 2024-04-05 | Stop reason: HOSPADM

## 2024-03-30 RX ORDER — ONDANSETRON 2 MG/ML
4 INJECTION INTRAMUSCULAR; INTRAVENOUS EVERY 6 HOURS PRN
Status: DISCONTINUED | OUTPATIENT
Start: 2024-03-30 | End: 2024-04-05 | Stop reason: HOSPADM

## 2024-03-30 RX ADMIN — AZITHROMYCIN MONOHYDRATE 500 MG: 500 INJECTION, POWDER, LYOPHILIZED, FOR SOLUTION INTRAVENOUS at 06:33

## 2024-03-30 RX ADMIN — CEFTRIAXONE 1 G: 1 INJECTION, POWDER, FOR SOLUTION INTRAMUSCULAR; INTRAVENOUS at 05:49

## 2024-03-30 RX ADMIN — ENOXAPARIN SODIUM 40 MG: 40 INJECTION SUBCUTANEOUS at 08:06

## 2024-03-30 ASSESSMENT — ACTIVITIES OF DAILY LIVING (ADL)
ADLS_ACUITY_SCORE: 35
ADLS_ACUITY_SCORE: 35
ADLS_ACUITY_SCORE: 22
ADLS_ACUITY_SCORE: 35
ADLS_ACUITY_SCORE: 22
ADLS_ACUITY_SCORE: 35
ADLS_ACUITY_SCORE: 22
ADLS_ACUITY_SCORE: 35
ADLS_ACUITY_SCORE: 22
ADLS_ACUITY_SCORE: 35
ADLS_ACUITY_SCORE: 22
ADLS_ACUITY_SCORE: 35

## 2024-03-30 ASSESSMENT — COLUMBIA-SUICIDE SEVERITY RATING SCALE - C-SSRS
6. HAVE YOU EVER DONE ANYTHING, STARTED TO DO ANYTHING, OR PREPARED TO DO ANYTHING TO END YOUR LIFE?: NO
2. HAVE YOU ACTUALLY HAD ANY THOUGHTS OF KILLING YOURSELF IN THE PAST MONTH?: NO
1. IN THE PAST MONTH, HAVE YOU WISHED YOU WERE DEAD OR WISHED YOU COULD GO TO SLEEP AND NOT WAKE UP?: NO

## 2024-03-30 NOTE — H&P
[facility]    History and Physical - Hospitalist Service       Date of Admission:  3/30/2024    Assessment & Plan      Lydia Johnson is a 68 year old female admitted on 3/30/2024. She has a past medical history of chronic cough, osteopenia, hypertension, dyslipidemia.    Patient presented with hypoxia, leukocytosis and was noted to have evidence of bilateral pulmonary infiltrates on noncontrast CT chest.  1/.  Acute hypoxemic respiratory failure: Likely related to community-acquired pneumonia: Supplemental oxygen, respiratory therapy evaluation, as needed DuoNebs.  2/.  Community-acquired pneumonia patient will be administered azithromycin and ceftriaxone on an ongoing basis.  3/.  Chronic cough and ongoing issues with her respiratory problems, not unreasonable to obtain a cardiac echogram which has been ordered.  /.  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  Anticipate discharge upon improvement in oxygen requirement.  As mentioned patient is well-established with pulmonary medicine and her healthcare system        Diet:  regular diet  DVT Prophylaxis: Enoxaparin (Lovenox) SQ  Huerta Catheter: Not present  Lines: None     Cardiac Monitoring: None  Code Status:  Full    Clinically Significant Risk Factors Present on Admission                    # Acute Respiratory Failure: Documented O2 saturation < 91%.  Continue supplemental oxygen as needed                Disposition Plan    1-2 days       Samantha Peralta MD  Hospitalist Service  [facility]  Securely message with [employer] (more info)  Text page via [employer] Paging/Directory     ______________________________________________________________________    Chief Complaint   Shortness of breath    History is obtained from the patient, electronic health record, and emergency department physician    History of Present Illness   Lydia Johnson is a 68 year old female who has a past medical  history of chronic cough, osteopenia, hypertension, dyslipidemia.  Patient is being admitted to the emergency department where she presented with above issue.  According to the patient, she has been feeling short of breath and has had fever with chills for the last couple of days.  On arrival she was noted to be profoundly hypoxic with O2 sats in the mid 80s.  Extensive workup was undertaken in the ER which included a negative D-dimer, negative troponin and unremarkable EKGs.  However she did demonstrate to have elevated WBC count of greater than 19,000 with a left shift.  Chest x-ray was essentially not very conclusive however patient was unable to be weaned off oxygen and with a working diagnosis of community-acquired pneumonia patient has been requested for admission to the inpatient service.  Patient is likely to undergo a CT of the chest without contrast, she has received antibiotics in the form of ceftriaxone and azithromycin.  She denies any juma chest pain, nausea, vomiting.  Viral workup in the ER is negative.  Patient does have a chronic cough for which she sees pulmonary medicine on a regular basis and has been diagnosed to have what sounds like reactive airway disease.  Extensive workup has been negative.  However she has not had a cardiac echogram that I can see      Past Medical History    No past medical history on file.    Past Surgical History   No past surgical history on file.    Prior to Admission Medications   None        Review of Systems    The 10 point Review of Systems is negative other than noted in the HPI or here.     Social History   I have reviewed this patient's social history and updated it with pertinent information if needed.         Family History     Not relevant for today's admission      Allergies   No Known Allergies     Physical Exam   Vital Signs: Temp: 98.9  F (37.2  C) Temp src: Temporal BP: (!) 158/59 Pulse: 81   Resp: (!) 9 SpO2: 96 % O2 Device: Nasal cannula Oxygen  Delivery: 2 LPM  Weight: 0 lbs 0 oz    General Appearance: Alert awake oriented x 3 no acute distress appears to be comfortable  Respiratory: No wheezing or crackles  Cardiovascular: S1-S2  GI: Soft nontender bowel sounds are present  Skin: No rash or lesions  Other: No JVD  No lower extremity edema  No neurological deficits    Medical Decision Making       80 MINUTES SPENT BY ME on the date of service doing chart review, history, exam, documentation & further activities per the note.      Data   ------------------------- PAST 24 HR DATA REVIEWED -----------------------------------------------    I have personally reviewed the following data over the past 24 hrs:    19.8 (H)  \   13.7   / 165     138 98 21.9 /  119 (H)   3.5 27 1.16 (H) \     ALT: 20 AST: 21 AP: 96 TBILI: 0.9   ALB: 4.0 TOT PROTEIN: 7.1 LIPASE: N/A     Trop: 6 BNP: N/A     INR:  N/A PTT:  N/A   D-dimer:  0.40 Fibrinogen:  N/A       Imaging results reviewed over the past 24 hrs:   Recent Results (from the past 24 hour(s))   Chest XR,  PA & LAT    Narrative    EXAM: CHEST 2 VIEWS  LOCATION: [facility]  DATE: 03/30/2024    INDICATION: Cough. Hypoxia. Fever.  COMPARISON: 07/25/2023.    FINDINGS: The lungs are clear. Normal-sized cardiac silhouette.      Impression    IMPRESSION: No evidence of active cardiopulmonary disease.      Chest CT w/o contrast    Narrative    EXAM: CT CHEST W/O CONTRAST  LOCATION: [facility]  DATE: 3/30/2024    INDICATION: hypooxia, fever, negative chest xray  COMPARISON: 03/30/2024  TECHNIQUE: CT chest without IV contrast. Multiplanar reformats were obtained. Dose reduction techniques were used.  CONTRAST: None.    FINDINGS:   LUNGS AND PLEURA: Bilateral micronodular infiltrates. Atelectasis or infiltrate right lower lobe. No pleural effusion. Dominant 5 mm left lower lobe nodule series 4 image 164.    MEDIASTINUM/AXILLAE: No adenopathy or significant pericardial  effusion.    CORONARY ARTERY CALCIFICATION: Moderate.    UPPER ABDOMEN: Grossly within normal limits where seen.    MUSCULOSKELETAL: Degenerative change osseous structures.      Impression    IMPRESSION:   1.  Bilateral infiltrates presumably infectious or inflammatory. Follow-up to confirm resolution.

## 2024-03-30 NOTE — ED NOTES
Assisted Pt. To bedside commode. Pt. Not lightheaded or dizzy. At rest Pt. O2 sats at 93% with the transfer Pt. O2 sats at 90% after Pt. Back in bed O2 sats at 93%. Pt. On 2LPM via NC the entire time.

## 2024-03-30 NOTE — PLAN OF CARE
"Goal Outcome Evaluation:      Plan of Care Reviewed With: patient, spouse    Overall Patient Progress: improvingOverall Patient Progress: improving    Outcome Evaluation: Pt able to wean oxygen from 2 Lpm down to 1 LPM, but cough is still present.    Blood pressure (!) 169/63, pulse 84, temperature 99.1  F (37.3  C), temperature source Oral, resp. rate 20, height 1.702 m (5' 7\"), weight 77.7 kg (171 lb 4.8 oz), SpO2 96%.    Assumed care of patient at 1325.  VSS other then elevated systolic BP and using Oxygen at 1 LPM via nasal cannula.  A&Ox4.  Pt denies pain and CP.  SOB happens when patient coughs and pt is GILES.   SBA when ambulating to bathroom.  LS diminished.  Skin WDL.  With family through most of shift.  Uses call lights appropriately on Rocephin and will be on Azithromycin oral tablets in future.  PIV in right AC.    "

## 2024-03-30 NOTE — PROGRESS NOTES
"Speech-Language Pathology Clinical Swallow Evaluation          03/30/24 5865   Appointment Info   Signing Clinician's Name / Credentials (SLP) Dianna Cavanaugh MS, CCC-SLP   General Information   Onset of Illness/Injury or Date of Surgery 03/30/24   Referring Physician Shaw Dobson,    Pertinent History of Current Problem Per H&P, \"Lydia Johnson is a 68 year old female admitted on 3/30/2024. She has a past medical history of chronic cough, osteopenia, hypertension, dyslipidemia.     Patient presented with hypoxia, leukocytosis and was noted to have evidence of bilateral pulmonary infiltrates on noncontrast CT chest.\"   Type of Evaluation   Type of Evaluation Swallow Evaluation   Oral Motor   Oral Musculature generally intact   Structural Abnormalities none present   Mucosal Quality good   Dentition (Oral Motor)   Dentition (Oral Motor) natural dentition;adequate dentition   Cough/Swallow/Gag Reflex (Oral Motor)   Volitional Throat Clear/Cough (Oral Motor) WNL   Vocal Quality/Secretion Management (Oral Motor)   Vocal Quality (Oral Motor) hoarse   Secretion Management (Oral Motor) WNL   General Swallowing Observations   Past History of Dysphagia None per pt/chart. Pt seen for OP SLP for chronic cough in 2018 and reported plan to return for additional services.   Current Diet/Method of Nutritional Intake (General Swallowing Observations, NIS) regular diet;thin liquids (level 0)   Swallowing Evaluation Clinical swallow evaluation   Clinical Swallow Evaluation   Clinical Swallow Evaluation Textures Trialed thin liquids;pureed;solid foods   Clinical Swallow Eval: Thin Liquid Texture Trial   Mode of Presentation, Thin Liquids cup;straw   Oral Phase of Swallow WFL   Pharyngeal Phase of Swallow intact   Clinical Swallow Evaluation: Puree Solid Texture Trial   Oral Phase, Puree WFL   Pharyngeal Phase, Puree intact   Clinical Swallow Evaluation: Solid Food Texture Trial   Oral Phase WFL   Pharyngeal Phase intact "   Swallowing Recommendations   Diet Consistency Recommendations regular diet;thin liquids (level 0)   Supervision Level for Intake distant supervision needed   Mode of Delivery Recommendations bolus size, small;slow rate of intake   Swallowing Maneuver Recommendations alternate food and liquid intake   Monitoring/Assistance Required (Eating/Swallowing) stop eating activities when fatigue is present;monitor for cough or change in vocal quality with intake   Recommended Feeding/Eating Techniques (Swallow Eval) maintain upright sitting position for eating   Medication Administration Recommendations, Swallowing (SLP) whole with liquid   Clinical Impression   Criteria for Skilled Therapeutic Interventions Met (SLP Eval) Yes, treatment indicated   SLP Diagnosis functional oral swallow and possible pharyngeal dysphagia   Clinical Impression Comments Pt currently presents with functional oral swallow and possible pharyngeal dysphagia. + adequate acceptance/containment. Bolus formation/propulsion, lingual coordination, and mastication appeared adequate. Noted timely oral transit with complete clearance. Suspect timely swallow with adequate hyolaryngeal elevation. + throat clear x1 following thin liquid via straw and delayed cough following solid x1 (pt also with baseline cough prior to PO trials). No other cough, throat clear, wet vocal quality, eye watering, or increased WOB.   SLP Total Evaluation Time   Eval: oral/pharyngeal swallow function, clinical swallow Minutes (84097) 12   SLP Goals   Therapy Frequency (SLP Eval) 3 times/week   SLP Predicted Duration/Target Date for Goal Attainment 04/07/24   SLP Discharge Planning   SLP Plan Diet tolerance, ?VFSS as indicated   SLP Discharge Recommendation home with outpatient therapy services   SLP Rationale for DC Rec OP SLP for chronic cough; suspect swallow goal will be met during admission   SLP Brief overview of current status  Recommend continue regular diet with thin  liquid -- upright posture, small bites/sips, slow rate, alternate solid/liquid PRN.

## 2024-03-30 NOTE — ED NOTES
"[facility]  ED Nurse Handoff Report    ED Chief complaint: Shortness of Breath  . ED Diagnosis:   Final diagnoses:   Acute respiratory failure with hypoxia (H)   Unresolved pneumonia       Allergies: No Known Allergies    Code Status: Full Code    Activity level - Baseline/Home:  independent.  Activity Level - Current:   standby.   Lift room needed: No.   Bariatric: No   Needed: No   Isolation: No.   Infection: Not Applicable.     Respiratory status: Nasal cannula    Vital Signs (within 30 minutes):   Vitals:    03/30/24 0345 03/30/24 0400 03/30/24 0600 03/30/24 0629   BP: (!) 157/67 (!) 158/59 (!) 153/58    Pulse: 83 81 77    Resp: 10 (!) 9  18   Temp:       TempSrc:       SpO2: 98% 96% 95%    Weight:    77.7 kg (171 lb 4.8 oz)   Height:    1.702 m (5' 7\")       Cardiac Rhythm:  ,      Pain level:    Patient confused: No.   Patient Falls Risk: nonskid shoes/slippers when out of bed and patient and family education.   Elimination Status: Has voided     Patient Report - Initial Complaint: SOB.   Focused Assessment: Pt presents to the ED with her  for evaluation of shortness of breath. She reports having chills within the last week and fever since last night. She states having shortness of breath that has been worsening last night. She took 1000 mg of Tylenol last night for fever      Abnormal Results:   Labs Ordered and Resulted from Time of ED Arrival to Time of ED Departure   COMPREHENSIVE METABOLIC PANEL - Abnormal       Result Value    Sodium 138      Potassium 3.5      Carbon Dioxide (CO2) 27      Anion Gap 13      Urea Nitrogen 21.9      Creatinine 1.16 (*)     GFR Estimate 51 (*)     Calcium 9.1      Chloride 98      Glucose 119 (*)     Alkaline Phosphatase 96      AST 21      ALT 20      Protein Total 7.1      Albumin 4.0      Bilirubin Total 0.9     CBC WITH PLATELETS AND DIFFERENTIAL - Abnormal    WBC Count 19.8 (*)     RBC Count 4.36      Hemoglobin 13.7      " Hematocrit 41.4      MCV 95      MCH 31.4      MCHC 33.1      RDW 12.2      Platelet Count 165      % Neutrophils 84      % Lymphocytes 10      % Monocytes 5      % Eosinophils 0      % Basophils 0      % Immature Granulocytes 1      NRBCs per 100 WBC 0      Absolute Neutrophils 16.7 (*)     Absolute Lymphocytes 1.9      Absolute Monocytes 0.9      Absolute Eosinophils 0.1      Absolute Basophils 0.1      Absolute Immature Granulocytes 0.1      Absolute NRBCs 0.0     INFLUENZA A/B, RSV, & SARS-COV2 PCR - Normal    Influenza A PCR Negative      Influenza B PCR Negative      RSV PCR Negative      SARS CoV2 PCR Negative     TROPONIN T, HIGH SENSITIVITY - Normal    Troponin T, High Sensitivity 6     D DIMER QUANTITATIVE - Normal    D-Dimer Quantitative 0.40          Chest CT w/o contrast   Final Result   IMPRESSION:    1.  Bilateral infiltrates presumably infectious or inflammatory. Follow-up to confirm resolution.         Chest XR,  PA & LAT   Final Result   IMPRESSION: No evidence of active cardiopulmonary disease.          Echocardiogram Complete    (Results Pending)       Treatments provided: See Mar  Family Comments: N/A  OBS brochure/video discussed/provided to patient:  Yes  ED Medications:   Medications   lidocaine 1 % 0.1-1 mL (has no administration in time range)   lidocaine (LMX4) cream (has no administration in time range)   sodium chloride (PF) 0.9% PF flush 3 mL (3 mLs Intracatheter $Given 3/30/24 9413)   sodium chloride (PF) 0.9% PF flush 3 mL (has no administration in time range)   senna-docusate (SENOKOT-S/PERICOLACE) 8.6-50 MG per tablet 1 tablet (has no administration in time range)     Or   senna-docusate (SENOKOT-S/PERICOLACE) 8.6-50 MG per tablet 2 tablet (has no administration in time range)   calcium carbonate (TUMS) chewable tablet 1,000 mg (has no administration in time range)   enoxaparin ANTICOAGULANT (LOVENOX) injection 40 mg (has no administration in time range)   acetaminophen (TYLENOL)  tablet 650 mg (has no administration in time range)     Or   acetaminophen (TYLENOL) Suppository 650 mg (has no administration in time range)   ondansetron (ZOFRAN ODT) ODT tab 4 mg (has no administration in time range)     Or   ondansetron (ZOFRAN) injection 4 mg (has no administration in time range)   prochlorperazine (COMPAZINE) injection 5 mg (has no administration in time range)     Or   prochlorperazine (COMPAZINE) tablet 5 mg (has no administration in time range)     Or   prochlorperazine (COMPAZINE) suppository 12.5 mg (has no administration in time range)   azithromycin (ZITHROMAX) tablet 250 mg (has no administration in time range)   cefTRIAXone (ROCEPHIN) 2 g vial to attach to  ml bag for ADULTS or NS 50 ml bag for PEDS (has no administration in time range)   ipratropium - albuterol 0.5 mg/2.5 mg/3 mL (DUONEB) neb solution 3 mL (has no administration in time range)   azithromycin 500 mg (ZITHROMAX) in 0.9% NaCl 250 mL intermittent infusion 500 mg (500 mg Intravenous $New Bag 3/30/24 0679)   cefTRIAXone (ROCEPHIN) 1 g vial to attach to  mL bag for ADULTS or NS 50 mL bag for PEDS (1 g Intravenous $New Bag 3/30/24 3887)       Drips infusing:  Yes  For the majority of the shift this patient was Green.   Interventions performed were N/A.    Sepsis treatment initiated: No    Cares/treatment/interventions/medications to be completed following ED care: N/A    ED Nurse Name: Elana Wilson RN  7:12 AM RECEIVING UNIT ED HANDOFF REVIEW    Above ED Nurse Handoff Report was reviewed: Yes  Reviewed by: Miguel Patricio RN on March 30, 2024 at 1:03 PM   DOLORES Benton called the ED to inform them the note was read: No

## 2024-03-30 NOTE — PHARMACY-ADMISSION MEDICATION HISTORY
Pharmacist Admission Medication History    Admission medication history is complete. The information provided in this note is only as accurate as the sources available at the time of the update.    Information Source(s): Patient and CareEverywhere/SureScripts via in-person    Pertinent Information:      Changes made to PTA medication list:  Added: All  Deleted: None  Changed: None    Allergies reviewed with patient and updates made in EHR: yes    Medication History Completed By: Adry Graham PharmD 3/30/2024 10:20 AM    PTA Med List   Medication Sig Last Dose    atenolol (TENORMIN) 25 MG tablet Take 25 mg by mouth 2 times daily 3/29/2024 at am    atorvastatin (LIPITOR) 10 MG tablet Take 10 mg by mouth daily 3/29/2024 at am    fluticasone (FLONASE) 50 MCG/ACT nasal spray Spray 2 sprays into both nostrils daily 3/29/2024 at am    fluticasone-salmeterol (AIRDUO RESPICLICK) 113-14 MCG/ACT inhaler Inhale 1 puff into the lungs 2 times daily 3/29/2024 at am    losartan-hydrochlorothiazide (HYZAAR) 50-12.5 MG tablet Take 1 tablet by mouth daily 3/29/2024 at am    omeprazole (PRILOSEC) 20 MG DR capsule Take 20 mg by mouth daily 3/29/2024 at am

## 2024-03-30 NOTE — ED PROVIDER NOTES
History     Chief Complaint:  Shortness of Breath       HPI   Lydia Johnson is a 68 year old female with history of hypertension who presents to the ED with her  for evaluation of shortness of breath. She reports having chills within the last week and fever since last night. She states having shortness of breath that has been worsening last night. She took 1000 mg of Tylenol last night for fever. No use of Oxygen. Denies leg swelling.     Independent Historian:   None - Patient Only    Review of External Notes:       Medications:    Tenormin  Lipitor  Hyzaar  Prilosec  Prednisone    Past Medical History:    Essential hypertension  Atrophy of vulva  Adenomatous colon polyp  Pneumonia     Past Surgical History:      Tubal ligation  [place]ister teeth extraction  Tonsillectomy      Physical Exam   Patient Vitals for the past 24 hrs:   BP Temp Temp src Pulse Resp SpO2   24 0400 (!) 158/59 -- -- 81 (!) 9 96 %   24 0345 (!) 157/67 -- -- 83 10 98 %   24 0330 (!) 162/69 -- -- 85 22 96 %   24 0326 (!) 171/72 -- -- 89 -- 96 %   24 0319 -- -- -- -- -- (!) 89 %   24 0318 -- -- -- -- -- (!) 85 %   24 0313 (!) 143/78 98.9  F (37.2  C) Temporal 100 26 (!) 87 %      Physical Exam  Vitals reviewed.   HENT:      Head: Normocephalic.      Mouth/Throat:      Mouth: Mucous membranes are moist.   Eyes:      Pupils: Pupils are equal, round, and reactive to light.   Cardiovascular:      Rate and Rhythm: Normal rate and regular rhythm.   Pulmonary:      Effort: Pulmonary effort is normal.      Breath sounds: Normal breath sounds.   Musculoskeletal:         General: Normal range of motion.      Cervical back: Normal range of motion.   Skin:     General: Skin is warm.      Capillary Refill: Capillary refill takes less than 2 seconds.   Neurological:      General: No focal deficit present.      Mental Status: She is alert.   Psychiatric:         Mood and Affect: Mood normal.            Emergency Department Course   ECG  ECG taken at 0318, ECG read at 0337  Normal sinus rhythm  Nonspecific ST abnormality    Rate 90 bpm. RI interval 152 ms. QRS duration 84 ms. QT/QTc 350/428 ms. P-R-T axes 68 69 33.     Imaging:  Chest XR,  PA & LAT   Final Result   IMPRESSION: No evidence of active cardiopulmonary disease.          Chest CT w/o contrast    (Results Pending)      Report per radiology    Laboratory:  Labs Ordered and Resulted from Time of ED Arrival to Time of ED Departure   COMPREHENSIVE METABOLIC PANEL - Abnormal       Result Value    Sodium 138      Potassium 3.5      Carbon Dioxide (CO2) 27      Anion Gap 13      Urea Nitrogen 21.9      Creatinine 1.16 (*)     GFR Estimate 51 (*)     Calcium 9.1      Chloride 98      Glucose 119 (*)     Alkaline Phosphatase 96      AST 21      ALT 20      Protein Total 7.1      Albumin 4.0      Bilirubin Total 0.9     CBC WITH PLATELETS AND DIFFERENTIAL - Abnormal    WBC Count 19.8 (*)     RBC Count 4.36      Hemoglobin 13.7      Hematocrit 41.4      MCV 95      MCH 31.4      MCHC 33.1      RDW 12.2      Platelet Count 165      % Neutrophils 84      % Lymphocytes 10      % Monocytes 5      % Eosinophils 0      % Basophils 0      % Immature Granulocytes 1      NRBCs per 100 WBC 0      Absolute Neutrophils 16.7 (*)     Absolute Lymphocytes 1.9      Absolute Monocytes 0.9      Absolute Eosinophils 0.1      Absolute Basophils 0.1      Absolute Immature Granulocytes 0.1      Absolute NRBCs 0.0     INFLUENZA A/B, RSV, & SARS-COV2 PCR - Normal    Influenza A PCR Negative      Influenza B PCR Negative      RSV PCR Negative      SARS CoV2 PCR Negative     TROPONIN T, HIGH SENSITIVITY - Normal    Troponin T, High Sensitivity 6     D DIMER QUANTITATIVE - Normal    D-Dimer Quantitative 0.40        Procedures   None    Emergency Department Course & Assessments:    Interventions:  Medications   cefTRIAXone (ROCEPHIN) 1 g vial to attach to  mL bag for ADULTS or  NS 50 mL bag for PEDS (has no administration in time range)   azithromycin 500 mg (ZITHROMAX) in 0.9% NaCl 250 mL intermittent infusion 500 mg (has no administration in time range)      Independent Interpretation (X-rays, CTs, rhythm strip):  None    Assessments/Consultations/Discussion of Management or Tests:  ED Course as of 03/30/24 0544   Sat Mar 30, 2024   0334 I obtained history and examined the patient as noted above.    0508 I rechecked the patient and explained findings.    0543 I spoke with Dr. Samantha Peralta, hospitalist, who agreed to admit the patient.      Social Determinants of Health affecting care:   None    Disposition:  The patient was admitted to the hospital under the care of Dr. Peralta.     Impression & Plan      MIPS (If applicable):  N/A    Medical Decision Making:  Patient is a 68-year-old female who presents with shortness of breath noted to be hypoxic with sats in the upper 80s.  Normally not on oxygen.  Does have Flonase but no known pulmonary diagnosis.  Is felt to be low risk for PE.  High clinical suspicions for pneumonia.  Chest x-ray according to radiology negative.  White count is elevated because of which is unclear.  D-dimer added due to hypoxia in the setting of shortness of breath without clear x-ray findings and was negative due to high clinical suspicions for pneumonia noncontrast CT of the lungs was performed and positive.  Patient offered antibiotics and admission due to hypoxic respiratory failure without heart per carb yet.  Care was discussed with the hospitalist and was admitted as an inpatient.    Diagnosis:    ICD-10-CM    1. Acute respiratory failure with hypoxia (H)  J96.01       2. Unresolved pneumonia  J18.9            Scribe Disclosure:  Stephanie BERNAL, am serving as a scribe at 3:36 AM on 3/30/2024 to document services personally performed by Brent De León MD based on my observations and the provider's statements to me.   3/30/2024   Brent De León,  MD De León, Brent More MD  03/31/24 5977

## 2024-03-30 NOTE — ED TRIAGE NOTES
Here concern of sob x1 week associated with chills, but is worse tonight. Stated sob worse with laying down. Also c/o chest tightness and nausea. Fever of 102.4F tonight. Stated has been having a cough since January. Oxygen saturation 87%-88% in triage.      Triage Assessment (Adult)       Row Name 03/30/24 0310          Triage Assessment    Airway WDL WDL        Respiratory WDL    Respiratory WDL rhythm/pattern     Rhythm/Pattern, Respiratory shortness of breath        Cardiac WDL    Cardiac WDL WDL

## 2024-03-30 NOTE — PLAN OF CARE
Patient seen and examined and H&P reviewed along with admission labs, vitals, etc. Noted to have seen her pulmonologist for similar issues about 2 weeks ago. Has had issues with dysphagia in past and I'm unsure if that's contributing since she had bilateral infiltrates. Will get ST to see. Cont antibiotics and monitor. No wheezing and recently completed steroids and antibiotics without improvement so hold steroids for now. If not improving may consider pulmonology eval on Monday. Is on some oxygen, awaiting ECHO results.

## 2024-03-31 LAB
ALBUMIN SERPL BCG-MCNC: 3.3 G/DL (ref 3.5–5.2)
ALP SERPL-CCNC: 81 U/L (ref 40–150)
ALT SERPL W P-5'-P-CCNC: 18 U/L (ref 0–50)
ANION GAP SERPL CALCULATED.3IONS-SCNC: 11 MMOL/L (ref 7–15)
AST SERPL W P-5'-P-CCNC: 18 U/L (ref 0–45)
ATRIAL RATE - MUSE: 90 BPM
BASOPHILS # BLD AUTO: 0.1 10E3/UL (ref 0–0.2)
BASOPHILS NFR BLD AUTO: 1 %
BILIRUB SERPL-MCNC: 1 MG/DL
BUN SERPL-MCNC: 13 MG/DL (ref 8–23)
CALCIUM SERPL-MCNC: 9.1 MG/DL (ref 8.8–10.2)
CHLORIDE SERPL-SCNC: 99 MMOL/L (ref 98–107)
CREAT SERPL-MCNC: 0.95 MG/DL (ref 0.51–0.95)
DEPRECATED HCO3 PLAS-SCNC: 28 MMOL/L (ref 22–29)
DIASTOLIC BLOOD PRESSURE - MUSE: NORMAL MMHG
EGFRCR SERPLBLD CKD-EPI 2021: 65 ML/MIN/1.73M2
EOSINOPHIL # BLD AUTO: 0.1 10E3/UL (ref 0–0.7)
EOSINOPHIL NFR BLD AUTO: 1 %
ERYTHROCYTE [DISTWIDTH] IN BLOOD BY AUTOMATED COUNT: 12.2 % (ref 10–15)
GLUCOSE SERPL-MCNC: 117 MG/DL (ref 70–99)
HCT VFR BLD AUTO: 38.4 % (ref 35–47)
HGB BLD-MCNC: 12.6 G/DL (ref 11.7–15.7)
IMM GRANULOCYTES # BLD: 0 10E3/UL
IMM GRANULOCYTES NFR BLD: 0 %
INTERPRETATION ECG - MUSE: NORMAL
LYMPHOCYTES # BLD AUTO: 1.9 10E3/UL (ref 0.8–5.3)
LYMPHOCYTES NFR BLD AUTO: 18 %
MCH RBC QN AUTO: 31.5 PG (ref 26.5–33)
MCHC RBC AUTO-ENTMCNC: 32.8 G/DL (ref 31.5–36.5)
MCV RBC AUTO: 96 FL (ref 78–100)
MONOCYTES # BLD AUTO: 0.6 10E3/UL (ref 0–1.3)
MONOCYTES NFR BLD AUTO: 6 %
NEUTROPHILS # BLD AUTO: 7.6 10E3/UL (ref 1.6–8.3)
NEUTROPHILS NFR BLD AUTO: 74 %
NRBC # BLD AUTO: 0 10E3/UL
NRBC BLD AUTO-RTO: 0 /100
P AXIS - MUSE: 68 DEGREES
PLATELET # BLD AUTO: 148 10E3/UL (ref 150–450)
POTASSIUM SERPL-SCNC: 3.4 MMOL/L (ref 3.4–5.3)
PR INTERVAL - MUSE: 152 MS
PROT SERPL-MCNC: 6.5 G/DL (ref 6.4–8.3)
QRS DURATION - MUSE: 84 MS
QT - MUSE: 350 MS
QTC - MUSE: 428 MS
R AXIS - MUSE: 69 DEGREES
RBC # BLD AUTO: 4 10E6/UL (ref 3.8–5.2)
SODIUM SERPL-SCNC: 138 MMOL/L (ref 135–145)
SYSTOLIC BLOOD PRESSURE - MUSE: NORMAL MMHG
T AXIS - MUSE: 33 DEGREES
VENTRICULAR RATE- MUSE: 90 BPM
WBC # BLD AUTO: 10.2 10E3/UL (ref 4–11)

## 2024-03-31 PROCEDURE — 120N000001 HC R&B MED SURG/OB

## 2024-03-31 PROCEDURE — 250N000013 HC RX MED GY IP 250 OP 250 PS 637: Performed by: HOSPITALIST

## 2024-03-31 PROCEDURE — 250N000013 HC RX MED GY IP 250 OP 250 PS 637: Performed by: INTERNAL MEDICINE

## 2024-03-31 PROCEDURE — 36415 COLL VENOUS BLD VENIPUNCTURE: CPT | Performed by: INTERNAL MEDICINE

## 2024-03-31 PROCEDURE — 250N000011 HC RX IP 250 OP 636: Performed by: INTERNAL MEDICINE

## 2024-03-31 PROCEDURE — 85025 COMPLETE CBC W/AUTO DIFF WBC: CPT | Performed by: INTERNAL MEDICINE

## 2024-03-31 PROCEDURE — 99232 SBSQ HOSP IP/OBS MODERATE 35: CPT | Performed by: HOSPITALIST

## 2024-03-31 PROCEDURE — 82374 ASSAY BLOOD CARBON DIOXIDE: CPT | Performed by: INTERNAL MEDICINE

## 2024-03-31 RX ORDER — ATENOLOL 25 MG/1
25 TABLET ORAL 2 TIMES DAILY
Status: DISCONTINUED | OUTPATIENT
Start: 2024-03-31 | End: 2024-04-05 | Stop reason: HOSPADM

## 2024-03-31 RX ORDER — ATORVASTATIN CALCIUM 10 MG/1
10 TABLET, FILM COATED ORAL DAILY
Status: DISCONTINUED | OUTPATIENT
Start: 2024-03-31 | End: 2024-04-05 | Stop reason: HOSPADM

## 2024-03-31 RX ORDER — PANTOPRAZOLE SODIUM 40 MG/1
40 TABLET, DELAYED RELEASE ORAL DAILY
Status: DISCONTINUED | OUTPATIENT
Start: 2024-03-31 | End: 2024-04-05 | Stop reason: HOSPADM

## 2024-03-31 RX ORDER — LOSARTAN POTASSIUM AND HYDROCHLOROTHIAZIDE 12.5; 5 MG/1; MG/1
1 TABLET ORAL DAILY
Status: DISCONTINUED | OUTPATIENT
Start: 2024-03-31 | End: 2024-04-05 | Stop reason: HOSPADM

## 2024-03-31 RX ORDER — FLUTICASONE FUROATE AND VILANTEROL 100; 25 UG/1; UG/1
1 POWDER RESPIRATORY (INHALATION) DAILY
Status: DISCONTINUED | OUTPATIENT
Start: 2024-03-31 | End: 2024-04-01

## 2024-03-31 RX ORDER — FLUTICASONE PROPIONATE 50 MCG
2 SPRAY, SUSPENSION (ML) NASAL DAILY
Status: DISCONTINUED | OUTPATIENT
Start: 2024-03-31 | End: 2024-04-05 | Stop reason: HOSPADM

## 2024-03-31 RX ADMIN — PANTOPRAZOLE SODIUM 40 MG: 40 TABLET, DELAYED RELEASE ORAL at 13:21

## 2024-03-31 RX ADMIN — ATORVASTATIN CALCIUM 10 MG: 10 TABLET, FILM COATED ORAL at 13:21

## 2024-03-31 RX ADMIN — ATENOLOL 25 MG: 25 TABLET ORAL at 21:06

## 2024-03-31 RX ADMIN — LOSARTAN POTASSIUM AND HYDROCHLOROTHIAZIDE 1 TABLET: 50; 12.5 TABLET, FILM COATED ORAL at 13:21

## 2024-03-31 RX ADMIN — FLUTICASONE PROPIONATE 2 SPRAY: 50 SPRAY, METERED NASAL at 13:58

## 2024-03-31 RX ADMIN — FLUTICASONE FUROATE AND VILANTEROL TRIFENATATE 1 PUFF: 100; 25 POWDER RESPIRATORY (INHALATION) at 13:58

## 2024-03-31 RX ADMIN — ATENOLOL 25 MG: 25 TABLET ORAL at 13:21

## 2024-03-31 RX ADMIN — CEFTRIAXONE 2 G: 2 INJECTION, POWDER, FOR SOLUTION INTRAMUSCULAR; INTRAVENOUS at 06:30

## 2024-03-31 RX ADMIN — ENOXAPARIN SODIUM 40 MG: 40 INJECTION SUBCUTANEOUS at 08:23

## 2024-03-31 RX ADMIN — AZITHROMYCIN DIHYDRATE 250 MG: 250 TABLET ORAL at 08:23

## 2024-03-31 ASSESSMENT — ACTIVITIES OF DAILY LIVING (ADL)
ADLS_ACUITY_SCORE: 23

## 2024-03-31 NOTE — PLAN OF CARE
"Goal Outcome Evaluation:      Plan of Care Reviewed With: patient, spouse    Overall Patient Progress: no changeOverall Patient Progress: no change    Outcome Evaluation: Pt continues require oxygen when ambulating with sats descending to 85% during walk while at 1 O2 at 1Lpm and requiring O2 at 2 Lpm to keep O2 sats between 90-95%.    Blood pressure (!) 140/55, pulse 69, temperature 99.5  F (37.5  C), temperature source Oral, resp. rate 18, height 1.702 m (5' 7\"), weight 77.7 kg (171 lb 4.8 oz), SpO2 93%.    VSS with slightly elevated BP.  Resumed home medications.  Increasing need for O2 today.  With family through part of shift.  Did ambulate in halls with staff.  Denies Pain, CP, and SOB.  Does have Dyspnea on Exertion.  O2 desating to 85% during ambulation. SBA.  PIV locked in Right AC.  Continue with POC.    "

## 2024-03-31 NOTE — PLAN OF CARE
Goal Outcome Evaluation:      Plan of Care Reviewed With: patient    Overall Patient Progress: improvingOverall Patient Progress: improving       Pt is alert and oriented, makes her needs known. Pt up with a standby assist in the room. Pt on IV Rocephin and oral Zithromax  for antibiotics. Pt slept well between cares. BP trending up.

## 2024-03-31 NOTE — PROGRESS NOTES
"[facility]    Medicine Progress Note - Hospitalist Service    Date of Admission:  3/30/2024    Assessment & Plan   Lydia Johnson is a 68 year old female who has a past medical history of chronic cough, osteopenia, hypertension, dyslipidemia who presents with suspected pneumonia, hypoxia.    Suspected pneumonia, possible CAP vs other  Acute hypoxic respiratory failure  Chronic cough  -has followed with pulmonology for about a year for chronic cough. Had PFT's done and other testing but ultimately cause was unclear. Flared up again earlier this month, saw pulm and given antibiotics and steroids mid March. Symptoms initially with some improvement but presented with fever, cough, sob.  -noted to be hypoxic in ED with bilateral infiltrates on CT, on 2L NC, WBC 19.8  -D dimer negative and ECHO w/preserved EF, otherwise unremarkable, viral testing negative  -started on Azithro and rocephin with resolution of fever, leukocytosis-continue  -seen by ST and minimal dysphagia, unclear if contributing  -does have appx 20 years smoking hx but no wheezing on exam, resumed home inhalers but hold off on steroids with recent treatment outpt  -unclear cause of her hypoxia, still sob and symptomatic w/mildl hypoxia. No significant improvement to date despite seemingly appropriate care for above  -cough non productive, unable to get sputum cx  -cont antibiotics and will consult pulm, unclear if she would benefit from bronch?    dysphagia  -seen by ST and noted to have \"functional oral swallow and possible pharyngeal dysphagia \"  -denies coughing after eating or correlation to above symptoms, unclear if contributing  -no significant diet changes recommended, mostly aspiration precautions    Hx HTN, possible GERD, DLD  -resume home atenolol, statin  -cont omeprazole, trialed by pulm due to her chronic cough        Diet: Combination Diet Regular Diet Adult    DVT Prophylaxis: Enoxaparin (Lovenox) SQ  [place] " Catheter: Not present  Lines: None     Cardiac Monitoring: None  Code Status: Full Code        Disposition Plan   Await pulm eval tomorrow and improvement         Shaw Dobson DO  Hospitalist Service  [facility]  Securely message with SHIFT (more info)  Text page via [email or web address] Paging/Directory   ______________________________________________________________________    Interval History   Still with sob and hypoxia, worse with exertion. Afebrile and leukocytosis has resolved. Cough is non productive. No significant clinical change so far    Physical Exam   Vital Signs: Temp: 99.4  F (37.4  C) Temp src: Oral BP: (!) 163/62 Pulse: 83   Resp: 19 SpO2: 93 % O2 Device: Nasal cannula Oxygen Delivery: 1 LPM  Weight: 171 lbs 4.76 oz    Constitutional: awake, alert, and cooperative  Eyes: pupils equal, round and reactive to light and conjunctiva normal  ENT: normocephalic, without obvious abnormality, atraumatic  Respiratory: difficult exam due to shallow breathing from frequent coughing, no wheezing, no obvious crackles or ronchi but seem decreased at bases  Cardiovascular: regular rate and rhythm, no murmur noted, and no edema  GI: normal bowel sounds, soft, and non-distended  Skin: no bruising or bleeding  Neurologic: alert, oriented, no focal deficits    45 MINUTES SPENT BY ME on the date of service doing chart review, history, exam, documentation & further activities per the note.      Data   ------------------------- PAST 24 HR DATA REVIEWED -----------------------------------------------    I have personally reviewed the following data over the past 24 hrs:    10.2  \   12.6   / 148 (L)     138 99 13.0 /  117 (H)   3.4 28 0.95 \     ALT: 18 AST: 18 AP: 81 TBILI: 1.0   ALB: 3.3 (L) TOT PROTEIN: 6.5 LIPASE: N/A       Imaging results reviewed over the past 24 hrs:   No results found for this or any previous visit (from the past 24 hour(s)).

## 2024-03-31 NOTE — PLAN OF CARE
"Goal Outcome Evaluation:      Plan of Care Reviewed With: patient    Overall Patient Progress: improving    Outcome Evaluation: VSS. denies pain. Pt desats and decomes dyspneic when off O2, coughing or walking to the BR. LS clear Sats at rest 91-94% on 1L NC..      Problem: Activity Intolerance  Goal: Enhanced Capacity and Energy  Outcome: Not Progressing  Intervention: Optimize Activity Tolerance  Recent Flowsheet Documentation  Taken 3/30/2024 2200 by Emily Knight, RN  Activity Management: activity adjusted per tolerance     Problem: Adult Inpatient Plan of Care  Goal: Plan of Care Review  Description: The Plan of Care Review/Shift note should be completed every shift.  The Outcome Evaluation is a brief statement about your assessment that the patient is improving, declining, or no change.  This information will be displayed automatically on your shift  note.  Outcome: Progressing  Flowsheets (Taken 3/31/2024 0049)  Outcome Evaluation: VSS. denies pain. Pt desats and decomes dyspneic when off O2, coughing or walking to the BR. LS clear Sats at rest 91-94% on 1L NC..  Plan of Care Reviewed With: patient  Overall Patient Progress: improving  Goal: Patient-Specific Goal (Individualized)  Description: You can add care plan individualizations to a care plan. Examples of Individualization might be:  \"Parent requests to be called daily at 9am for status\", \"I have a hard time hearing out of my right ear\", or \"Do not touch me to wake me up as it startles  me\".  Outcome: Progressing  Goal: Absence of Hospital-Acquired Illness or Injury  Outcome: Progressing  Intervention: Identify and Manage Fall Risk  Recent Flowsheet Documentation  Taken 3/30/2024 2200 by Emily Knight, RN  Safety Promotion/Fall Prevention: safety round/check completed  Taken 3/30/2024 1935 by Emily Knight, RN  Safety Promotion/Fall Prevention: safety round/check completed  Intervention: Prevent Skin Injury  Recent Flowsheet " Documentation  Taken 3/30/2024 2200 by Emily Knight, RN  Body Position: supine  Intervention: Prevent Infection  Recent Flowsheet Documentation  Taken 3/30/2024 2200 by Emily Knight, RN  Infection Prevention:   single patient room provided   rest/sleep promoted   hand hygiene promoted  Goal: Optimal Comfort and Wellbeing  Outcome: Progressing  Intervention: Monitor Pain and Promote Comfort  Recent Flowsheet Documentation  Taken 3/30/2024 2200 by Emily Knight RN  Pain Management Interventions: rest  Goal: Readiness for Transition of Care  Outcome: Progressing     Problem: Gas Exchange Impaired  Goal: Optimal Gas Exchange  Outcome: Progressing  Intervention: Optimize Oxygenation and Ventilation  Recent Flowsheet Documentation  Taken 3/30/2024 2200 by Emily Knight, RN  Head of Bed (HOB) Positioning: HOB flat

## 2024-04-01 ENCOUNTER — APPOINTMENT (OUTPATIENT)
Dept: SPEECH THERAPY | Facility: CLINIC | Age: 69
DRG: 193 | End: 2024-04-01
Payer: MEDICARE

## 2024-04-01 PROCEDURE — 250N000011 HC RX IP 250 OP 636: Performed by: INTERNAL MEDICINE

## 2024-04-01 PROCEDURE — 36415 COLL VENOUS BLD VENIPUNCTURE: CPT | Performed by: HOSPITALIST

## 2024-04-01 PROCEDURE — 87449 NOS EACH ORGANISM AG IA: CPT | Performed by: HOSPITALIST

## 2024-04-01 PROCEDURE — 99232 SBSQ HOSP IP/OBS MODERATE 35: CPT | Performed by: HOSPITALIST

## 2024-04-01 PROCEDURE — 250N000013 HC RX MED GY IP 250 OP 250 PS 637: Performed by: HOSPITALIST

## 2024-04-01 PROCEDURE — 94640 AIRWAY INHALATION TREATMENT: CPT | Mod: 76

## 2024-04-01 PROCEDURE — 250N000013 HC RX MED GY IP 250 OP 250 PS 637: Performed by: INTERNAL MEDICINE

## 2024-04-01 PROCEDURE — 999N000157 HC STATISTIC RCP TIME EA 10 MIN

## 2024-04-01 PROCEDURE — 99223 1ST HOSP IP/OBS HIGH 75: CPT | Performed by: INTERNAL MEDICINE

## 2024-04-01 PROCEDURE — 94640 AIRWAY INHALATION TREATMENT: CPT

## 2024-04-01 PROCEDURE — 92526 ORAL FUNCTION THERAPY: CPT | Mod: GN

## 2024-04-01 PROCEDURE — 120N000001 HC R&B MED SURG/OB

## 2024-04-01 PROCEDURE — 250N000009 HC RX 250: Performed by: INTERNAL MEDICINE

## 2024-04-01 RX ORDER — FORMOTEROL FUMARATE DIHYDRATE 20 UG/2ML
20 SOLUTION RESPIRATORY (INHALATION) EVERY 12 HOURS
Status: DISCONTINUED | OUTPATIENT
Start: 2024-04-01 | End: 2024-04-05 | Stop reason: HOSPADM

## 2024-04-01 RX ORDER — METHYLPREDNISOLONE SODIUM SUCCINATE 40 MG/ML
40 INJECTION, POWDER, LYOPHILIZED, FOR SOLUTION INTRAMUSCULAR; INTRAVENOUS EVERY 8 HOURS
Status: DISCONTINUED | OUTPATIENT
Start: 2024-04-01 | End: 2024-04-05 | Stop reason: HOSPADM

## 2024-04-01 RX ORDER — LEVALBUTEROL INHALATION SOLUTION 1.25 MG/3ML
1.25 SOLUTION RESPIRATORY (INHALATION) EVERY 4 HOURS PRN
Status: DISCONTINUED | OUTPATIENT
Start: 2024-04-01 | End: 2024-04-05 | Stop reason: HOSPADM

## 2024-04-01 RX ORDER — IPRATROPIUM BROMIDE AND ALBUTEROL SULFATE 2.5; .5 MG/3ML; MG/3ML
3 SOLUTION RESPIRATORY (INHALATION)
Status: DISCONTINUED | OUTPATIENT
Start: 2024-04-01 | End: 2024-04-05 | Stop reason: HOSPADM

## 2024-04-01 RX ADMIN — IPRATROPIUM BROMIDE AND ALBUTEROL SULFATE 3 ML: .5; 3 SOLUTION RESPIRATORY (INHALATION) at 16:11

## 2024-04-01 RX ADMIN — ATENOLOL 25 MG: 25 TABLET ORAL at 20:58

## 2024-04-01 RX ADMIN — METHYLPREDNISOLONE SODIUM SUCCINATE 40 MG: 40 INJECTION, POWDER, FOR SOLUTION INTRAMUSCULAR; INTRAVENOUS at 15:55

## 2024-04-01 RX ADMIN — ATORVASTATIN CALCIUM 10 MG: 10 TABLET, FILM COATED ORAL at 08:35

## 2024-04-01 RX ADMIN — FLUTICASONE FUROATE AND VILANTEROL TRIFENATATE 1 PUFF: 100; 25 POWDER RESPIRATORY (INHALATION) at 08:36

## 2024-04-01 RX ADMIN — FLUTICASONE PROPIONATE 2 SPRAY: 50 SPRAY, METERED NASAL at 08:36

## 2024-04-01 RX ADMIN — IPRATROPIUM BROMIDE AND ALBUTEROL SULFATE 3 ML: .5; 3 SOLUTION RESPIRATORY (INHALATION) at 19:53

## 2024-04-01 RX ADMIN — PANTOPRAZOLE SODIUM 40 MG: 40 TABLET, DELAYED RELEASE ORAL at 08:34

## 2024-04-01 RX ADMIN — ENOXAPARIN SODIUM 40 MG: 40 INJECTION SUBCUTANEOUS at 08:39

## 2024-04-01 RX ADMIN — CEFTRIAXONE 2 G: 2 INJECTION, POWDER, FOR SOLUTION INTRAMUSCULAR; INTRAVENOUS at 05:58

## 2024-04-01 RX ADMIN — AZITHROMYCIN DIHYDRATE 250 MG: 250 TABLET ORAL at 08:34

## 2024-04-01 RX ADMIN — ATENOLOL 25 MG: 25 TABLET ORAL at 08:34

## 2024-04-01 RX ADMIN — LOSARTAN POTASSIUM AND HYDROCHLOROTHIAZIDE 1 TABLET: 50; 12.5 TABLET, FILM COATED ORAL at 08:35

## 2024-04-01 ASSESSMENT — ACTIVITIES OF DAILY LIVING (ADL)
ADLS_ACUITY_SCORE: 23
ADLS_ACUITY_SCORE: 25
ADLS_ACUITY_SCORE: 23
ADLS_ACUITY_SCORE: 25
ADLS_ACUITY_SCORE: 25
ADLS_ACUITY_SCORE: 23
ADLS_ACUITY_SCORE: 25
ADLS_ACUITY_SCORE: 23

## 2024-04-01 NOTE — PROGRESS NOTES
"[facility]    Medicine Progress Note - Hospitalist Service    Date of Admission:  3/30/2024    Assessment & Plan   Lydia Johnson is a 68 year old female who has a past medical history of chronic cough, osteopenia, hypertension, dyslipidemia who presents with suspected pneumonia, hypoxia.    Suspected pneumonia, possible CAP vs other  Acute hypoxic respiratory failure  Chronic cough  -has followed with pulmonology for about a year for chronic cough. Had PFT's done and other testing but ultimately cause was unclear. Flared up again earlier this month, saw pulm and given antibiotics and steroids mid March. Symptoms initially with some improvement but presented with fever, cough, sob.  -noted to be hypoxic in ED with bilateral infiltrates on CT, on 2L NC, WBC 19.8  -D dimer negative and ECHO w/preserved EF, otherwise unremarkable, viral testing negative  -started on Azithro and rocephin with resolution of fever, leukocytosis-continue  -seen by ST and minimal dysphagia, unclear if contributing  -does have appx 20 years smoking hx but no wheezing on exam, resumed home inhalers but hold off on steroids with recent treatment outpt  -unclear cause of her hypoxia, still sob and symptomatic w/mildl hypoxia. No significant improvement to date despite seemingly appropriate care for above  -cough non productive, unable to get sputum cx  -cont antibiotics and will consult pulm, unclear if she would benefit from bronch?    dysphagia  -seen by ST and noted to have \"functional oral swallow and possible pharyngeal dysphagia \"  -denies coughing after eating or correlation to above symptoms, unclear if contributing  -no significant diet changes recommended, mostly aspiration precautions    Hx HTN, possible GERD, DLD  -resume home atenolol, statin  -cont omeprazole, trialed by pulm due to her chronic cough        Diet: Combination Diet Regular Diet Adult    DVT Prophylaxis: Enoxaparin (Lovenox) SQ  Huerta " Catheter: Not present  Lines: None     Cardiac Monitoring: None  Code Status: Full Code        Disposition Plan   Await pulmonology estefany Dobson DO  Hospitalist Service  [facility]  Securely message with [employer] (more info)  Text page via [employer] Paging/Directory   ______________________________________________________________________    Interval History   No significant change, still with cough mostly non productive. Still on minimal oxygen, weak, sob    Physical Exam   Vital Signs: Temp: 100.1  F (37.8  C) Temp src: Oral BP: (!) 140/66 Pulse: 62   Resp: 18 SpO2: 93 % O2 Device: None (Room air) Oxygen Delivery: 1 LPM  Weight: 171 lbs 4.76 oz  Constitutional: awake, alert, and cooperative  Eyes: pupils equal, round and reactive to light and conjunctiva normal  ENT: normocephalic, without obvious abnormality, atraumatic  Respiratory: difficult exam due to shallow breathing from frequent coughing, no wheezing, no obvious crackles or ronchi but seem decreased at bases  Cardiovascular: regular rate and rhythm, no murmur noted, and no edema  GI: normal bowel sounds, soft, and non-distended  Skin: no bruising or bleeding  Neurologic: alert, oriented, no focal deficits    45 MINUTES SPENT BY ME on the date of service doing chart review, history, exam, documentation & further activities per the note.      Data   ------------------------- PAST 24 HR DATA REVIEWED -----------------------------------------------        Imaging results reviewed over the past 24 hrs:   No results found for this or any previous visit (from the past 24 hour(s)).

## 2024-04-01 NOTE — PLAN OF CARE
"VSS, AO x4, denies pain, independent in room. 2L NC destat exertion and coughing BLL course, apical pulse regular, no noted edema a this time, continent x2. Awaiting pulmonary consult, SLP swallow study ordered to be completed tomorrow.    Goal Outcome Evaluation:   Plan of Care Reviewed With: patient Overall Patient Progress: no change Outcome Evaluation: 2L NC desat on exertion and coughing, cough non-productive and frequent    Problem: Adult Inpatient Plan of Care  Goal: Plan of Care Review  Description: The Plan of Care Review/Shift note should be completed every shift.  The Outcome Evaluation is a brief statement about your assessment that the patient is improving, declining, or no change.  This information will be displayed automatically on your shift  note.  Outcome: Not Progressing  Flowsheets (Taken 4/1/2024 1243)  Outcome Evaluation: 2L NC desat on exertion and coughing, cough non-productive and frequent  Plan of Care Reviewed With: patient  Overall Patient Progress: no change  Goal: Patient-Specific Goal (Individualized)  Description: You can add care plan individualizations to a care plan. Examples of Individualization might be:  \"Parent requests to be called daily at 9am for status\", \"I have a hard time hearing out of my right ear\", or \"Do not touch me to wake me up as it startles  me\".  Outcome: Not Progressing  Goal: Absence of Hospital-Acquired Illness or Injury  Outcome: Not Progressing  Intervention: Identify and Manage Fall Risk  Recent Flowsheet Documentation  Taken 4/1/2024 0900 by Shellie Henson, RN  Safety Promotion/Fall Prevention:   safety round/check completed   supervised activity  Goal: Optimal Comfort and Wellbeing  Outcome: Not Progressing  Goal: Readiness for Transition of Care  Outcome: Not Progressing     Problem: Gas Exchange Impaired  Goal: Optimal Gas Exchange  Outcome: Not Progressing     Problem: Activity Intolerance  Goal: Enhanced Capacity and Energy  Outcome: Not Progressing   "

## 2024-04-01 NOTE — PLAN OF CARE
"Goal Outcome Evaluation:      Plan of Care Reviewed With: patient    Overall Patient Progress: no changeOverall Patient Progress: no change     Chronic cough, receiving scheduled Ceftriaxone and anticipating pulmonologist consult.      Problem: Adult Inpatient Plan of Care  Goal: Plan of Care Review  Description: The Plan of Care Review/Shift note should be completed every shift.  The Outcome Evaluation is a brief statement about your assessment that the patient is improving, declining, or no change.  This information will be displayed automatically on your shift  note.  Outcome: Not Progressing  Flowsheets (Taken 4/1/2024 0812)  Plan of Care Reviewed With: patient  Overall Patient Progress: no change  Goal: Patient-Specific Goal (Individualized)  Description: You can add care plan individualizations to a care plan. Examples of Individualization might be:  \"Parent requests to be called daily at 9am for status\", \"I have a hard time hearing out of my right ear\", or \"Do not touch me to wake me up as it startles  me\".  Outcome: Not Progressing  Goal: Absence of Hospital-Acquired Illness or Injury  Outcome: Not Progressing  Intervention: Identify and Manage Fall Risk  Recent Flowsheet Documentation  Taken 4/1/2024 0051 by Ravin Guerin RN  Safety Promotion/Fall Prevention: safety round/check completed  Intervention: Prevent Skin Injury  Recent Flowsheet Documentation  Taken 4/1/2024 0051 by Ravin Guerin RN  Body Position: position changed independently  Intervention: Prevent and Manage VTE (Venous Thromboembolism) Risk  Recent Flowsheet Documentation  Taken 4/1/2024 0051 by Ravin Guerin RN  VTE Prevention/Management: SCDs (sequential compression devices) off  Intervention: Prevent Infection  Recent Flowsheet Documentation  Taken 4/1/2024 0051 by Ravin Guerin RN  Infection Prevention:   single patient room provided   rest/sleep promoted   hand hygiene promoted  Goal: Optimal Comfort and Wellbeing  Outcome: Not " Progressing  Goal: Readiness for Transition of Care  Outcome: Not Progressing     Problem: Gas Exchange Impaired  Goal: Optimal Gas Exchange  Outcome: Not Progressing  Intervention: Optimize Oxygenation and Ventilation  Recent Flowsheet Documentation  Taken 4/1/2024 0051 by Ravin Guerin, RN  Head of Bed (HOB) Positioning: HOB at 30-45 degrees     Problem: Activity Intolerance  Goal: Enhanced Capacity and Energy  Outcome: Not Progressing  Intervention: Optimize Activity Tolerance  Recent Flowsheet Documentation  Taken 4/1/2024 0051 by Ravin Guerin, RN  Activity Management: activity adjusted per tolerance  Taken 3/31/2024 2106 by Ravin Guerin, RN  Activity Management: activity adjusted per tolerance

## 2024-04-01 NOTE — PLAN OF CARE
SLP: Recommend completion of video swallow study given chronic, unexplained cough and current admission for pneumonia to rule out aspiration as cause. VFSS to be completed 4/2. Patient demonstrated understanding.

## 2024-04-01 NOTE — CONSULTS
PULMONOLOGY CONSULTATION      Lydia Johnson   MRN# [id number]   Age: 68 year old YOB: 1955     Date of Admission:  3/30/2024  Reason for Consultation:     dyspnea, hypoxia   Requesting Physician:         No referring provider defined for this encounter.       Assessment and Plan:    1. Dyspnea and hypoxia in setting of chronic cough. CT show mild inflammation and in my opinion, out of proportion of his symptoms. TTE was normal. Differential includes AECOPD vs PE on top of an atypical infection. I think the best initial course is to treat for AECOPD via steroids, RTC bronchodilators and antibiotics. If her symptoms do not improve over the next 2-3 days or gets worse, please order CT PE protocol to r/o PE.   - solumedrol 40mg IV q8  - RTC duonebs and brovana   - discontinue inhalers for now   - cont antibiotics     Billing: I spent a total of 75min for an initial consult including review of chart, images, labs and notes (20min), history taking and examination (30min) and post-consultation documentation, decision making and discussion with primary team and nursing (25min).     Jake Sanchez MD, MHA  Associate Professor of Medicine  Section of Interventional Pulmonology   Division of Pulmonary, Allergy, Critical Care and Sleep Medicine   [facility], [facility]  Pager: [phone]   Office: [phone]  Email: [email or web address]         HPI/Interval history     Lydia Johnson is a 68 year old female with sig h/o for chronic cough, osteopenia, hypertension, dyslipidemia  who we are consulted for dyspnea and hypoxia.    -Admitted 3/30 with ongoing dyspnea and hypoxia  - CT chest w/ scattered ggo's. No dense consolidation noted. Background emphysema  - h/o chronic cough and saw her pulmonologist at [facility] last month. PFT showed mod airflow obstruction w preserved DLCO c/w mild-mod COPD. Had an outpatient sputum positive for mycoplasma. Other cultures have been negative including  COVID and influenza   - On admission, treated with CAP abx.   - Pulm consulted for further workup and management of her respiratory issues          Past Medical History:    No past medical history on file.        Past Surgical History:    No past surgical history on file.       Social History:     Social History     Tobacco Use    Smoking status: Not on file    Smokeless tobacco: Not on file   Substance Use Topics    Alcohol use: Not on file          Family History:   No family history on file.        Allergies:    No Known Allergies       Medications:     Current Facility-Administered Medications   Medication    acetaminophen (TYLENOL) tablet 650 mg    Or    acetaminophen (TYLENOL) Suppository 650 mg    atenolol (TENORMIN) tablet 25 mg    atorvastatin (LIPITOR) tablet 10 mg    azithromycin (ZITHROMAX) tablet 250 mg    calcium carbonate (TUMS) chewable tablet 1,000 mg    cefTRIAXone (ROCEPHIN) 2 g vial to attach to  ml bag for ADULTS or NS 50 ml bag for PEDS    enoxaparin ANTICOAGULANT (LOVENOX) injection 40 mg    fluticasone (FLONASE) 50 MCG/ACT spray 2 spray    fluticasone-vilanterol (BREO ELLIPTA) 100-25 MCG/ACT inhaler 1 puff    ipratropium - albuterol 0.5 mg/2.5 mg/3 mL (DUONEB) neb solution 3 mL    lidocaine (LMX4) cream    lidocaine 1 % 0.1-1 mL    losartan-hydrochlorothiazide (HYZAAR) 50-12.5 MG per tablet 1 tablet    ondansetron (ZOFRAN ODT) ODT tab 4 mg    Or    ondansetron (ZOFRAN) injection 4 mg    pantoprazole (PROTONIX) EC tablet 40 mg    prochlorperazine (COMPAZINE) injection 5 mg    Or    prochlorperazine (COMPAZINE) tablet 5 mg    Or    prochlorperazine (COMPAZINE) suppository 12.5 mg    senna-docusate (SENOKOT-S/PERICOLACE) 8.6-50 MG per tablet 1 tablet    Or    senna-docusate (SENOKOT-S/PERICOLACE) 8.6-50 MG per tablet 2 tablet    sodium chloride (PF) 0.9% PF flush 3 mL    sodium chloride (PF) 0.9% PF flush 3 mL          Review of Systems:     CONSTITUTIONAL: negative for fever, chills,  change in weight  INTEGUMENTARY/SKIN: no rash or obvious new lesions  ENT/MOUTH: no sore throat, new sinus pain or nasal drainage  RESP: see interval history  CV: negative for chest pain, palpitations or peripheral edema  GI: no nausea, vomiting, change in stools  : no dysuria  MUSCULOSKELETAL: no myalgias, arthralgias  ENDOCRINE: blood sugars with adequate control  PSYCHIATRIC: mood stable  LYMPHATIC: no new lymphadenopathy  HEME: no bleeding or easy bruisability  NEURO: no numbness, weakness, headaches         Physical Exam:     Temp:  [98.5  F (36.9  C)-100.1  F (37.8  C)] 100.1  F (37.8  C)  Pulse:  [62-89] 62  Resp:  [18] 18  BP: (140-163)/(55-66) 140/66  SpO2:  [93 %] 93 %  Wt Readings from Last 4 Encounters:   03/30/24 77.7 kg (171 lb 4.8 oz)   07/25/23 73.9 kg (163 lb)     Constitutional:   Awake, alert and in no apparent distress     Eyes:   Nonicteric, STACY     ENT:    Trachea is midline. No gross neck abnormalities      Neck:   Supple without supraclavicular or cervical lymphadenopathy     Lungs:   Good air flow.  No crackles. No rhonchi.  No wheezes.     Cardiovascular:   Normal S1 and S2.  RRR.  No murmur, gallop or rub.  Radial, DP and PT pulses normal and symmetric     Abdomen:   NABS, soft, nontender, nondistended.  No HSM.     Musculoskeletal:   No edema.      Neurologic:   Alert and conversant. Cranial nerves  intact.       Skin:   Warm, dry.  No rash on limited exam.           Current Laboratory Data:   All laboratory and imaging data reviewed.

## 2024-04-02 ENCOUNTER — APPOINTMENT (OUTPATIENT)
Dept: SPEECH THERAPY | Facility: CLINIC | Age: 69
DRG: 193 | End: 2024-04-02
Payer: MEDICARE

## 2024-04-02 ENCOUNTER — APPOINTMENT (OUTPATIENT)
Dept: CT IMAGING | Facility: CLINIC | Age: 69
DRG: 193 | End: 2024-04-02
Attending: HOSPITALIST
Payer: MEDICARE

## 2024-04-02 ENCOUNTER — APPOINTMENT (OUTPATIENT)
Dept: GENERAL RADIOLOGY | Facility: CLINIC | Age: 69
DRG: 193 | End: 2024-04-02
Attending: HOSPITALIST
Payer: MEDICARE

## 2024-04-02 LAB
1,3 BETA GLUCAN SER-MCNC: <31 PG/ML
CREAT SERPL-MCNC: 0.7 MG/DL (ref 0.51–0.95)
EGFRCR SERPLBLD CKD-EPI 2021: >90 ML/MIN/1.73M2
OBSERVATION IMP: NEGATIVE
PLATELET # BLD AUTO: 206 10E3/UL (ref 150–450)

## 2024-04-02 PROCEDURE — 82565 ASSAY OF CREATININE: CPT | Performed by: HOSPITALIST

## 2024-04-02 PROCEDURE — 85049 AUTOMATED PLATELET COUNT: CPT | Performed by: HOSPITALIST

## 2024-04-02 PROCEDURE — 94640 AIRWAY INHALATION TREATMENT: CPT | Mod: 76

## 2024-04-02 PROCEDURE — 92610 EVALUATE SWALLOWING FUNCTION: CPT | Mod: GN

## 2024-04-02 PROCEDURE — 999N000157 HC STATISTIC RCP TIME EA 10 MIN

## 2024-04-02 PROCEDURE — 250N000011 HC RX IP 250 OP 636: Performed by: INTERNAL MEDICINE

## 2024-04-02 PROCEDURE — 250N000009 HC RX 250: Performed by: INTERNAL MEDICINE

## 2024-04-02 PROCEDURE — 83880 ASSAY OF NATRIURETIC PEPTIDE: CPT | Performed by: INTERNAL MEDICINE

## 2024-04-02 PROCEDURE — G1010 CDSM STANSON: HCPCS

## 2024-04-02 PROCEDURE — 120N000001 HC R&B MED SURG/OB

## 2024-04-02 PROCEDURE — 250N000009 HC RX 250: Performed by: HOSPITALIST

## 2024-04-02 PROCEDURE — 250N000013 HC RX MED GY IP 250 OP 250 PS 637: Performed by: INTERNAL MEDICINE

## 2024-04-02 PROCEDURE — 74230 X-RAY XM SWLNG FUNCJ C+: CPT

## 2024-04-02 PROCEDURE — 36415 COLL VENOUS BLD VENIPUNCTURE: CPT | Performed by: HOSPITALIST

## 2024-04-02 PROCEDURE — 250N000011 HC RX IP 250 OP 636: Performed by: HOSPITALIST

## 2024-04-02 PROCEDURE — 250N000013 HC RX MED GY IP 250 OP 250 PS 637: Performed by: HOSPITALIST

## 2024-04-02 PROCEDURE — 94640 AIRWAY INHALATION TREATMENT: CPT

## 2024-04-02 PROCEDURE — 92526 ORAL FUNCTION THERAPY: CPT | Mod: GN

## 2024-04-02 PROCEDURE — 99232 SBSQ HOSP IP/OBS MODERATE 35: CPT | Performed by: HOSPITALIST

## 2024-04-02 RX ORDER — BARIUM SULFATE 400 MG/ML
SUSPENSION ORAL ONCE
Status: DISCONTINUED | OUTPATIENT
Start: 2024-04-02 | End: 2024-04-02

## 2024-04-02 RX ORDER — IOPAMIDOL 755 MG/ML
500 INJECTION, SOLUTION INTRAVASCULAR ONCE
Status: COMPLETED | OUTPATIENT
Start: 2024-04-02 | End: 2024-04-02

## 2024-04-02 RX ADMIN — ENOXAPARIN SODIUM 40 MG: 40 INJECTION SUBCUTANEOUS at 08:18

## 2024-04-02 RX ADMIN — METHYLPREDNISOLONE SODIUM SUCCINATE 40 MG: 40 INJECTION, POWDER, FOR SOLUTION INTRAMUSCULAR; INTRAVENOUS at 16:43

## 2024-04-02 RX ADMIN — LOSARTAN POTASSIUM AND HYDROCHLOROTHIAZIDE 1 TABLET: 50; 12.5 TABLET, FILM COATED ORAL at 08:18

## 2024-04-02 RX ADMIN — IPRATROPIUM BROMIDE AND ALBUTEROL SULFATE 3 ML: .5; 3 SOLUTION RESPIRATORY (INHALATION) at 19:58

## 2024-04-02 RX ADMIN — FLUTICASONE PROPIONATE 2 SPRAY: 50 SPRAY, METERED NASAL at 08:26

## 2024-04-02 RX ADMIN — IPRATROPIUM BROMIDE AND ALBUTEROL SULFATE 3 ML: .5; 3 SOLUTION RESPIRATORY (INHALATION) at 11:48

## 2024-04-02 RX ADMIN — SODIUM CHLORIDE 100 ML: 9 INJECTION, SOLUTION INTRAVENOUS at 10:57

## 2024-04-02 RX ADMIN — AZITHROMYCIN DIHYDRATE 250 MG: 250 TABLET ORAL at 08:17

## 2024-04-02 RX ADMIN — METHYLPREDNISOLONE SODIUM SUCCINATE 40 MG: 40 INJECTION, POWDER, FOR SOLUTION INTRAMUSCULAR; INTRAVENOUS at 08:18

## 2024-04-02 RX ADMIN — CEFTRIAXONE 2 G: 2 INJECTION, POWDER, FOR SOLUTION INTRAMUSCULAR; INTRAVENOUS at 05:23

## 2024-04-02 RX ADMIN — METHYLPREDNISOLONE SODIUM SUCCINATE 40 MG: 40 INJECTION, POWDER, FOR SOLUTION INTRAMUSCULAR; INTRAVENOUS at 00:47

## 2024-04-02 RX ADMIN — IOPAMIDOL 62 ML: 755 INJECTION, SOLUTION INTRAVENOUS at 10:56

## 2024-04-02 RX ADMIN — ATORVASTATIN CALCIUM 10 MG: 10 TABLET, FILM COATED ORAL at 08:17

## 2024-04-02 RX ADMIN — PANTOPRAZOLE SODIUM 40 MG: 40 TABLET, DELAYED RELEASE ORAL at 08:17

## 2024-04-02 RX ADMIN — ATENOLOL 25 MG: 25 TABLET ORAL at 21:27

## 2024-04-02 RX ADMIN — IPRATROPIUM BROMIDE AND ALBUTEROL SULFATE 3 ML: .5; 3 SOLUTION RESPIRATORY (INHALATION) at 16:37

## 2024-04-02 RX ADMIN — ATENOLOL 25 MG: 25 TABLET ORAL at 08:18

## 2024-04-02 RX ADMIN — FORMOTEROL FUMARATE DIHYDRATE 20 MCG: 20 SOLUTION RESPIRATORY (INHALATION) at 19:58

## 2024-04-02 RX ADMIN — FORMOTEROL FUMARATE DIHYDRATE 20 MCG: 20 SOLUTION RESPIRATORY (INHALATION) at 08:29

## 2024-04-02 RX ADMIN — IPRATROPIUM BROMIDE AND ALBUTEROL SULFATE 3 ML: .5; 3 SOLUTION RESPIRATORY (INHALATION) at 08:29

## 2024-04-02 ASSESSMENT — ACTIVITIES OF DAILY LIVING (ADL)
ADLS_ACUITY_SCORE: 24
ADLS_ACUITY_SCORE: 24
ADLS_ACUITY_SCORE: 25
ADLS_ACUITY_SCORE: 24
ADLS_ACUITY_SCORE: 25
ADLS_ACUITY_SCORE: 24
ADLS_ACUITY_SCORE: 25
ADLS_ACUITY_SCORE: 24
ADLS_ACUITY_SCORE: 25
ADLS_ACUITY_SCORE: 24
ADLS_ACUITY_SCORE: 25
ADLS_ACUITY_SCORE: 24
ADLS_ACUITY_SCORE: 24

## 2024-04-02 NOTE — PLAN OF CARE
"VSS, AO x4, denies pain, independent in room. 2L NC desat coughing, apical pulse regular, no noted edema, continent x2. Video swallow study and CTA chest w contrast done today (see results), XR esophogram 10am tomorrow NPO for 4 hours prior.   Goal Outcome Evaluation: Plan of Care Reviewed With: patient Overall Patient Progress: improving Outcome Evaluation: walked in hallway SBA on 2L minimal/infrequent desats    Problem: Adult Inpatient Plan of Care  Goal: Plan of Care Review  Description: The Plan of Care Review/Shift note should be completed every shift.  The Outcome Evaluation is a brief statement about your assessment that the patient is improving, declining, or no change.  This information will be displayed automatically on your shift  note.  4/2/2024 1524 by Shellie Henson RN  Outcome: Progressing  Flowsheets (Taken 4/2/2024 1524)  Outcome Evaluation: walked in hallway SBA on 2L minimal/infrequent desats  Plan of Care Reviewed With: patient  4/2/2024 1522 by Shellie Henson RN  Outcome: Progressing  Flowsheets (Taken 4/2/2024 1522)  Outcome Evaluation: walked in hallway SBA on  2L NC minimal/infrequent desats  Plan of Care Reviewed With: patient  Overall Patient Progress: improving  4/2/2024 1517 by Shellie Henson RN  Outcome: Progressing  Flowsheets (Taken 4/2/2024 1517)  Outcome Evaluation: walked on 2L with little/minimal desat  Plan of Care Reviewed With: patient  Overall Patient Progress: improving  Goal: Patient-Specific Goal (Individualized)  Description: You can add care plan individualizations to a care plan. Examples of Individualization might be:  \"Parent requests to be called daily at 9am for status\", \"I have a hard time hearing out of my right ear\", or \"Do not touch me to wake me up as it startles  me\".  4/2/2024 1524 by Shellie Henson RN  Outcome: Progressing  4/2/2024 1522 by Shellie Henson, RN  Outcome: Progressing  4/2/2024 1517 by Shellie Henson, RN  Outcome: Progressing  Goal: Absence of " Hospital-Acquired Illness or Injury  4/2/2024 1524 by Shellie Henson RN  Outcome: Progressing  4/2/2024 1522 by Shellie Henson RN  Outcome: Progressing  4/2/2024 1517 by Shellie Henson RN  Outcome: Progressing  Intervention: Identify and Manage Fall Risk  Recent Flowsheet Documentation  Taken 4/2/2024 0816 by Shellie Henson RN  Safety Promotion/Fall Prevention: safety round/check completed  Goal: Optimal Comfort and Wellbeing  4/2/2024 1524 by Shellie eHnson RN  Outcome: Progressing  4/2/2024 1522 by Shellie Henson RN  Outcome: Progressing  4/2/2024 1517 by Shellie Henson, RN  Outcome: Progressing  Goal: Readiness for Transition of Care  4/2/2024 1524 by Shellie Henson RN  Outcome: Progressing  4/2/2024 1522 by Shellie Henson RN  Outcome: Progressing  4/2/2024 1517 by Shellie Henson RN  Outcome: Progressing     Problem: Gas Exchange Impaired  Goal: Optimal Gas Exchange  4/2/2024 1524 by Shellie Henson RN  Outcome: Progressing  4/2/2024 1522 by Shellie Henson RN  Outcome: Progressing  4/2/2024 1517 by Shellie Henson, RN  Outcome: Progressing     Problem: Activity Intolerance  Goal: Enhanced Capacity and Energy  4/2/2024 1524 by Shellie Henson RN  Outcome: Progressing  4/2/2024 1522 by Shellie Henson RN  Outcome: Progressing  4/2/2024 1517 by Shellie Henson, RN  Outcome: Progressing

## 2024-04-02 NOTE — PLAN OF CARE
"  Problem: Adult Inpatient Plan of Care  Goal: Plan of Care Review  Description: The Plan of Care Review/Shift note should be completed every shift.  The Outcome Evaluation is a brief statement about your assessment that the patient is improving, declining, or no change.  This information will be displayed automatically on your shift  note.  Outcome: Progressing  Flowsheets (Taken 4/2/2024 0546)  Outcome Evaluation: Pt on 2LNC,  Overall Patient Progress: no change  Goal: Patient-Specific Goal (Individualized)  Description: You can add care plan individualizations to a care plan. Examples of Individualization might be:  \"Parent requests to be called daily at 9am for status\", \"I have a hard time hearing out of my right ear\", or \"Do not touch me to wake me up as it startles  me\".  Outcome: Progressing  Goal: Absence of Hospital-Acquired Illness or Injury  Outcome: Progressing  Intervention: Identify and Manage Fall Risk  Recent Flowsheet Documentation  Taken 4/2/2024 0500 by Jose A Espana RN  Safety Promotion/Fall Prevention: safety round/check completed  Taken 4/2/2024 0400 by Jose A Espana RN  Safety Promotion/Fall Prevention: safety round/check completed  Taken 4/2/2024 0300 by Jose A Espana RN  Safety Promotion/Fall Prevention: safety round/check completed  Taken 4/2/2024 0200 by Jose A Espana RN  Safety Promotion/Fall Prevention: safety round/check completed  Taken 4/2/2024 0100 by Jose A Espana RN  Safety Promotion/Fall Prevention: safety round/check completed  Taken 4/2/2024 0046 by Jose A Espana RN  Safety Promotion/Fall Prevention:   safety round/check completed   nonskid shoes/slippers when out of bed   activity supervised   clutter free environment maintained  Taken 4/2/2024 0000 by Jose A Espana RN  Safety Promotion/Fall Prevention: safety round/check completed  Taken 4/1/2024 2300 by Jose A Espana RN  Safety Promotion/Fall Prevention: safety round/check " "completed  Intervention: Prevent Skin Injury  Recent Flowsheet Documentation  Taken 4/2/2024 0046 by Jose A Espana RN  Body Position: position changed independently  Intervention: Prevent and Manage VTE (Venous Thromboembolism) Risk  Recent Flowsheet Documentation  Taken 4/2/2024 0046 by Jose A Espana RN  VTE Prevention/Management: (Pt ambulates) SCDs (sequential compression devices) off  Intervention: Prevent Infection  Recent Flowsheet Documentation  Taken 4/2/2024 0046 by Jose A Espana RN  Infection Prevention:   rest/sleep promoted   hand hygiene promoted  Goal: Optimal Comfort and Wellbeing  Outcome: Progressing  Goal: Readiness for Transition of Care  Outcome: Progressing     Problem: Gas Exchange Impaired  Goal: Optimal Gas Exchange  Outcome: Progressing  Intervention: Optimize Oxygenation and Ventilation  Recent Flowsheet Documentation  Taken 4/2/2024 0046 by Jose A Espana RN  Head of Bed (HOB) Positioning: HOB at 30-45 degrees     Problem: Activity Intolerance  Goal: Enhanced Capacity and Energy  Outcome: Progressing   Goal Outcome Evaluation:           Overall Patient Progress: no changeOverall Patient Progress: no change    Outcome Evaluation: Pt on 2LNC,    Pt is alert and oriented x 4. Pt denies any pain. Diminished lung sounds, frequent cough persists and pt on 2LNC.    IV Solumedrol and Rocephin administered.     Pt ambulates independently in room. Ongoing monitoring.    Pulmonlogy and SLP following.    BP (!) 149/62 (BP Location: Left arm, Patient Position: Semi-Singh's, Cuff Size: Adult Regular)   Pulse 63   Temp 98.6  F (37  C) (Oral)   Resp 18   Ht 1.702 m (5' 7\")   Wt 77.7 kg (171 lb 4.8 oz)   SpO2 93%   BMI 26.83 kg/m     "

## 2024-04-02 NOTE — PROGRESS NOTES
"Video Fluoroscopic Swallow Study (VFSS)     04/02/24 0921   Appointment Info   Signing Clinician's Name / Credentials (SLP) Leonor Payne MS CCC-SLP   General Information   Onset of Illness/Injury or Date of Surgery 03/30/24   Referring Physician Shaw Dobson, DO   Patient/Family Therapy Goal Statement (SLP) To get better and go home   Pertinent History of Current Problem   Per H&P, \"Lydia Johnson is a 68 year old female admitted on 3/30/2024. She has a past medical history of chronic cough, osteopenia, hypertension, dyslipidemia.     Patient presented with hypoxia, leukocytosis and was noted to have evidence of bilateral pulmonary infiltrates on noncontrast CT chest.\" SLP for dysphagia work up, VFSS for further assessment.     General Observations Pt alert, pleasant, upright in chair, on 2 L NC   Type of Evaluation   Type of Evaluation Swallow Evaluation   General Swallowing Observations   Swallowing Evaluation Videofluoroscopic swallow study (VFSS)   VFSS Evaluation   Radiologist Dr. Escalera   Views Taken left lateral   Physical Location of Procedure [facility], radiology dept, fluoroscopy suite   VFSS Textures Trialed thin liquids;pureed;solid foods   VFSS Eval: Thin Liquid Texture Trial   Mode of Presentation, Thin Liquid cup;straw;self-fed   Order of Presentation 1, 2, 3, 7   Preparatory Phase poor bolus control   Oral Phase, Thin Liquid premature pharyngeal entry   Bolus Location When Swallow Triggered pyriforms   Pharyngeal Phase, Thin Liquid impaired tongue base retraction;impaired pharyngoesophageal segment opening  (minimally reduced)   Rosenbek's Penetration Aspiration Scale: Thin Liquid Trial Results 1 - no aspiration, contrast does not enter airway   VFSS Evaluation: Puree Solid Texture Trial   Mode of Presentation, Puree spoon;self-fed   Order of Presentation 4   Preparatory Phase WFL   Oral Phase, Puree WFL   Bolus Location When Swallow Triggered posterior angle of ramus "   Pharyngeal Phase, Puree impaired tongue base retraction;impaired pharyngoesophageal segment opening  (minimally reduced)   Rosenbek's Penetration Aspiration Scale: Puree Food Trial Results 1 - no aspiration, contrast does not enter airway   VFSS Evaluation: Solid Food Texture Trial   Mode of Presentation, Solid self-fed   Order of Presentation 5, 6   Preparatory Phase WFL   Oral Phase, Solid WFL   Bolus Location When Swallow Triggered posterior angle of ramus   Pharyngeal Phase, Solid impaired tongue base retraction;impaired pharyngoesophageal segment opening  (minimally reduced)   Rosenbek's Penetration Aspiration Scale: Solid Food Trial Results 1 - no aspiration, contrast does not enter airway   Esophageal Phase of Swallow   Esophageal comments Mild upper esophageal retention following 3 bites of solids, cleared with liquid wash; consider OP esophageal dysphagia work up.   Swallowing Recommendations   Diet Consistency Recommendations regular diet;thin liquids (level 0)   Supervision Level for Intake patient independent   Mode of Delivery Recommendations bolus size, small;slow rate of intake   Swallowing Maneuver Recommendations alternate food and liquid intake   Recommended Feeding/Eating Techniques (Swallow Eval) maintain upright sitting position for eating;maintain upright posture during/after eating for 30 minutes   Medication Administration Recommendations, Swallowing (SLP) whole, per pt preference   Clinical Impression   Criteria for Skilled Therapeutic Interventions Met (SLP Eval) Yes, treatment indicated   SLP Diagnosis minimal oropharyngeal dysphagia; suspected esophageal component   Clinical Impression Comments   Video fluoroscopic swallow study completed with thin liquids, puree solids, regular solids -- pt currently presents with minimal oropharyngeal dysphagia; suspected esophageal component. Mastication, oral propulsion and oral clearance WFL. Min reduced control/premature bolus spillage to the  pyriform sinuses with thin liquids, though no appreciable delay/overall functional. Minimally reduced base of tongue retraction and pharyngoesophageal segment opening during the swallow, resulting in trace-min residue with thin liquids, clearing with double swallow. Hyolaryngeal elevation/excursion, epiglottic inversion WFL. No laryngeal penetration or aspiration across study. Mild upper esophageal retention following 3 bites of solids, cleared with liquid wash; consider OP esophageal dysphagia work up and anti-reflux precautions.     SLP Total Evaluation Time   Eval: oral/pharyngeal swallow function, clinical swallow Minutes (45798) 10   SLP Goals   SLP: Safely tolerate diet without signs/symptoms of aspiration Regular diet;Thin liquids;With use of swallow precautions;Independently   Interventions   Interventions Quick Adds Swallowing Dysfunction   Swallowing Dysfunction &/or Oral Function for Feeding   Treatment of Swallowing Dysfunction &/or Oral Function for Feeding Minutes (98459) 8   Symptoms Noted During/After Treatment None   Treatment Detail/Skilled Intervention Educated pt on VFSS results directly following the study, including video review of cine loop recordinings, education re: specific deficits though WFL swallow for regular/thin diet, trained in anti-reflux precautions and potential OP GI follow up. Pt verbalized understanding of results/recommendations.   SLP Discharge Planning   SLP Plan education/strategies x1   SLP Discharge Recommendation home   SLP Rationale for DC Rec No post-acute SLP services indicated, though recommend OP GI cosult/work up -- notified provider.   SLP Brief overview of current status  Regular/thin when fully upright, remain upright 30-60 minutes after PO, slow rate, alternate between solids/liquids every 1-3 bites   Total Session Time   Total Session Time (sum of timed and untimed services) 18

## 2024-04-02 NOTE — PROGRESS NOTES
"[facility]    Medicine Progress Note - Hospitalist Service    Date of Admission:  3/30/2024    Assessment & Plan   Lydia Johnson is a 68 year old female who has a past medical history of chronic cough, osteopenia, hypertension, dyslipidemia who presents with suspected pneumonia, hypoxia.    Suspected pneumonia, possible CAP vs other  Acute hypoxic respiratory failure  Chronic cough  -has followed with pulmonology for about a year for chronic cough. Had PFT's done and other testing but ultimately cause was unclear. Flared up again earlier this month, saw pulm and given antibiotics and steroids mid March. Symptoms initially with some improvement but presented with fever, cough, sob.  -noted to be hypoxic in ED with bilateral infiltrates on CT, on 2L NC, WBC 19.8  -D dimer negative and ECHO w/preserved EF, otherwise unremarkable, viral testing negative  -seen by ST, abnormal findings as below  -does have appx 20 years smoking hx but no wheezing on exam  -cont azithro/rocephin to complete course, protonix for GERD  -seen by pulm, feel it's COPD exacerbation. Started on IV solumedrol 4/1 and will continue, no significant improvement. Inhalers changed to nebs  -will get CTA to rule out PE    dysphagia  -seen by ST and noted to have \"functional oral swallow and possible pharyngeal dysphagia \"  -video swallow was abnormal  -check esophagram today, pending that consider GI eval inpt vs. Outpt as suspect is component of her chronic cough    Hx HTN, possible GERD, DLD  -resume home atenolol, statin  -cont omeprazole, trialed by pulm due to her chronic cough        Diet: Combination Diet Regular Diet Adult    DVT Prophylaxis: Enoxaparin (Lovenox) SQ  Huerta Catheter: Not present  Lines: None     Cardiac Monitoring: None  Code Status: Full Code        Disposition Plan   Await improvement         Shaw Dobson DO  Hospitalist Service  [facility]  Securely message with [employer] " (more info)  Text page via [facility] Paging/Directory   ______________________________________________________________________    Interval History   Still with cough, non productive. She does feel better after starting steroids but always feels better with them. No fever or chills    Physical Exam   Vital Signs: Temp: 98  F (36.7  C) Temp src: (P) Oral BP: (!) 158/74 Pulse: 58   Resp: 18 SpO2: 95 % O2 Device: Nasal cannula Oxygen Delivery: 2 LPM  Weight: 171 lbs 4.76 oz  Constitutional: awake, alert, and cooperative  Eyes: pupils equal, round and reactive to light and conjunctiva normal  ENT: normocephalic, without obvious abnormality, atraumatic  Respiratory: difficult exam due to shallow breathing from frequent coughing, no wheezing, no obvious crackles or ronchi but seem decreased at bases  Cardiovascular: regular rate and rhythm, no murmur noted, and no edema  GI: normal bowel sounds, soft, and non-distended  Skin: no bruising or bleeding  Neurologic: alert, oriented, no focal deficits    45 MINUTES SPENT BY ME on the date of service doing chart review, history, exam, documentation & further activities per the note.      Data   ------------------------- PAST 24 HR DATA REVIEWED -----------------------------------------------    I have personally reviewed the following data over the past 24 hrs:    N/A  \   N/A   / 206     N/A N/A N/A /  N/A   N/A N/A 0.70 \       Imaging results reviewed over the past 24 hrs:   Recent Results (from the past 24 hour(s))   XR Video Swallow with SLP or OT    Narrative    VIDEO SPEECH EVALUATION WITH OR WITHOUT ESOPHAGRAM April 2, 2024 9:32  AM     HISTORY: Chronic cough >1year, admitted pneumonia, rule out  aspiration.    COMPARISON: None.    FLUOROSCOPY TIME: 0.8 minutes.    SPOT FILMS: Seven.    TECHNIQUE: A modified barium swallow is performed with speech  pathology. Note that only the cervical esophagus was evaluated in  lateral view.      Impression    IMPRESSION: No penetration  or aspiration with any tested consistency  of barium.    Please see the speech pathology report for further details.    KATIE JONES MD         SYSTEM ID:  [id number]

## 2024-04-02 NOTE — PLAN OF CARE
"Goal Outcome Evaluation:      Plan of Care Reviewed With: patient    Overall Patient Progress: no changeOverall Patient Progress: no change    Outcome Evaluation: Remains on 2L NC, up to 3L with ambulation in hallway. Cough has not improved, receiving scheduled nebs and solemedrol. Antibiotics continued.    Patient calm and cooperative. A/O x4. BP elevated, scheduled atenolol administered. SBP between 140's-150's, beta blockers brought it down to 130's. Denies chest pain, but does report dyspnea upon exertion. Requiring 2L O2 via NC and 3L NC on ambulation, which O2 sats decreased to 85% on 2L. Patient still c/o cough, which is dry and non-productive. Patient states nebs help right after but \"wears off shortly after.\" Lung sounds diminished. Pulmonology following. Plan to have swallow study done tomorrow, speech following. Right PIV dressing changed, saline locked. Patient denies pain. Solemedrol administered per order. Plan to continue monitoring and POC.     Problem: Adult Inpatient Plan of Care  Goal: Plan of Care Review  Description: The Plan of Care Review/Shift note should be completed every shift.  The Outcome Evaluation is a brief statement about your assessment that the patient is improving, declining, or no change.  This information will be displayed automatically on your shift  note.  Outcome: Progressing  Flowsheets (Taken 4/1/2024 2238)  Outcome Evaluation: Remains on 2L NC, up to 3L with ambulation in hallway. Cough has not improved, receiving scheduled nebs and solemedrol. Antibiotics continued.  Plan of Care Reviewed With: patient  Overall Patient Progress: no change  Goal: Patient-Specific Goal (Individualized)  Description: You can add care plan individualizations to a care plan. Examples of Individualization might be:  \"Parent requests to be called daily at 9am for status\", \"I have a hard time hearing out of my right ear\", or \"Do not touch me to wake me up as it startles  me\".  Outcome: " Progressing  Goal: Absence of Hospital-Acquired Illness or Injury  Outcome: Progressing  Intervention: Identify and Manage Fall Risk  Recent Flowsheet Documentation  Taken 4/1/2024 1936 by Kandis Duncan RN  Safety Promotion/Fall Prevention: safety round/check completed  Taken 4/1/2024 1556 by Kandis Duncan RN  Safety Promotion/Fall Prevention:   activity supervised   assistive device/personal items within reach   clutter free environment maintained   lighting adjusted   nonskid shoes/slippers when out of bed   patient and family education   room door open   room near nurse's station   room organization consistent   safety round/check completed  Intervention: Prevent Skin Injury  Recent Flowsheet Documentation  Taken 4/1/2024 1556 by Kandis Duncan, RN  Skin Protection:   adhesive use limited   pulse oximeter probe site changed  Device Skin Pressure Protection:   adhesive use limited   tubing/devices free from skin contact  Intervention: Prevent and Manage VTE (Venous Thromboembolism) Risk  Recent Flowsheet Documentation  Taken 4/1/2024 1556 by Kandis Duncan RN  VTE Prevention/Management: (Ambulatory) SCDs (sequential compression devices) off  Intervention: Prevent Infection  Recent Flowsheet Documentation  Taken 4/1/2024 1936 by Kandis Duncan RN  Infection Prevention:   equipment surfaces disinfected   hand hygiene promoted   rest/sleep promoted   single patient room provided  Taken 4/1/2024 1556 by Kandis Duncan RN  Infection Prevention:   equipment surfaces disinfected   hand hygiene promoted   rest/sleep promoted   single patient room provided  Goal: Optimal Comfort and Wellbeing  Outcome: Progressing  Goal: Readiness for Transition of Care  Outcome: Progressing     Problem: Gas Exchange Impaired  Goal: Optimal Gas Exchange  Outcome: Progressing     Problem: Activity Intolerance  Goal: Enhanced Capacity and Energy  Outcome: Progressing  Intervention: Optimize Activity  Tolerance  Recent Flowsheet Documentation  Taken 4/1/2024 1936 by Kandis Duncan, RN  Activity Management: (Ambulated in hallway) ambulated outside room

## 2024-04-03 ENCOUNTER — APPOINTMENT (OUTPATIENT)
Dept: GENERAL RADIOLOGY | Facility: CLINIC | Age: 69
DRG: 193 | End: 2024-04-03
Attending: HOSPITALIST
Payer: MEDICARE

## 2024-04-03 LAB
ERYTHROCYTE [DISTWIDTH] IN BLOOD BY AUTOMATED COUNT: 11.9 % (ref 10–15)
HCT VFR BLD AUTO: 35.3 % (ref 35–47)
HGB BLD-MCNC: 11.9 G/DL (ref 11.7–15.7)
MCH RBC QN AUTO: 31.6 PG (ref 26.5–33)
MCHC RBC AUTO-ENTMCNC: 33.7 G/DL (ref 31.5–36.5)
MCV RBC AUTO: 94 FL (ref 78–100)
MRSA DNA SPEC QL NAA+PROBE: NEGATIVE
NT-PROBNP SERPL-MCNC: 491 PG/ML (ref 0–900)
PLATELET # BLD AUTO: 234 10E3/UL (ref 150–450)
RBC # BLD AUTO: 3.77 10E6/UL (ref 3.8–5.2)
SA TARGET DNA: NEGATIVE
WBC # BLD AUTO: 11.7 10E3/UL (ref 4–11)

## 2024-04-03 PROCEDURE — 36415 COLL VENOUS BLD VENIPUNCTURE: CPT | Performed by: HOSPITALIST

## 2024-04-03 PROCEDURE — 99233 SBSQ HOSP IP/OBS HIGH 50: CPT | Performed by: INTERNAL MEDICINE

## 2024-04-03 PROCEDURE — 250N000011 HC RX IP 250 OP 636: Performed by: INTERNAL MEDICINE

## 2024-04-03 PROCEDURE — 74221 X-RAY XM ESOPHAGUS 2CNTRST: CPT

## 2024-04-03 PROCEDURE — 250N000013 HC RX MED GY IP 250 OP 250 PS 637: Performed by: INTERNAL MEDICINE

## 2024-04-03 PROCEDURE — 250N000009 HC RX 250: Performed by: INTERNAL MEDICINE

## 2024-04-03 PROCEDURE — 87070 CULTURE OTHR SPECIMN AEROBIC: CPT | Performed by: INTERNAL MEDICINE

## 2024-04-03 PROCEDURE — 87641 MR-STAPH DNA AMP PROBE: CPT | Performed by: INTERNAL MEDICINE

## 2024-04-03 PROCEDURE — 94640 AIRWAY INHALATION TREATMENT: CPT | Mod: 76

## 2024-04-03 PROCEDURE — 85027 COMPLETE CBC AUTOMATED: CPT | Performed by: HOSPITALIST

## 2024-04-03 PROCEDURE — 250N000013 HC RX MED GY IP 250 OP 250 PS 637: Performed by: RADIOLOGY

## 2024-04-03 PROCEDURE — 999N000157 HC STATISTIC RCP TIME EA 10 MIN

## 2024-04-03 PROCEDURE — 250N000013 HC RX MED GY IP 250 OP 250 PS 637: Performed by: HOSPITALIST

## 2024-04-03 PROCEDURE — 120N000001 HC R&B MED SURG/OB

## 2024-04-03 RX ORDER — FUROSEMIDE 10 MG/ML
40 INJECTION INTRAMUSCULAR; INTRAVENOUS EVERY 12 HOURS
Status: DISCONTINUED | OUTPATIENT
Start: 2024-04-03 | End: 2024-04-04

## 2024-04-03 RX ORDER — FUROSEMIDE 10 MG/ML
20 INJECTION INTRAMUSCULAR; INTRAVENOUS ONCE
Status: COMPLETED | OUTPATIENT
Start: 2024-04-03 | End: 2024-04-03

## 2024-04-03 RX ORDER — AZELASTINE 1 MG/ML
2 SPRAY, METERED NASAL 2 TIMES DAILY
Status: DISCONTINUED | OUTPATIENT
Start: 2024-04-03 | End: 2024-04-05 | Stop reason: HOSPADM

## 2024-04-03 RX ADMIN — IPRATROPIUM BROMIDE AND ALBUTEROL SULFATE 3 ML: .5; 3 SOLUTION RESPIRATORY (INHALATION) at 19:07

## 2024-04-03 RX ADMIN — IPRATROPIUM BROMIDE AND ALBUTEROL SULFATE 3 ML: .5; 3 SOLUTION RESPIRATORY (INHALATION) at 11:54

## 2024-04-03 RX ADMIN — ENOXAPARIN SODIUM 40 MG: 40 INJECTION SUBCUTANEOUS at 10:30

## 2024-04-03 RX ADMIN — FLUTICASONE PROPIONATE 2 SPRAY: 50 SPRAY, METERED NASAL at 10:24

## 2024-04-03 RX ADMIN — AZITHROMYCIN DIHYDRATE 250 MG: 250 TABLET ORAL at 10:24

## 2024-04-03 RX ADMIN — IPRATROPIUM BROMIDE AND ALBUTEROL SULFATE 3 ML: .5; 3 SOLUTION RESPIRATORY (INHALATION) at 07:23

## 2024-04-03 RX ADMIN — IPRATROPIUM BROMIDE AND ALBUTEROL SULFATE 3 ML: .5; 3 SOLUTION RESPIRATORY (INHALATION) at 15:29

## 2024-04-03 RX ADMIN — CEFTRIAXONE 2 G: 2 INJECTION, POWDER, FOR SOLUTION INTRAMUSCULAR; INTRAVENOUS at 05:38

## 2024-04-03 RX ADMIN — FUROSEMIDE 20 MG: 10 INJECTION, SOLUTION INTRAMUSCULAR; INTRAVENOUS at 10:26

## 2024-04-03 RX ADMIN — ATORVASTATIN CALCIUM 10 MG: 10 TABLET, FILM COATED ORAL at 10:25

## 2024-04-03 RX ADMIN — AZELASTINE 2 SPRAY: 1 SPRAY, METERED NASAL at 12:44

## 2024-04-03 RX ADMIN — LOSARTAN POTASSIUM AND HYDROCHLOROTHIAZIDE 1 TABLET: 50; 12.5 TABLET, FILM COATED ORAL at 10:25

## 2024-04-03 RX ADMIN — METHYLPREDNISOLONE SODIUM SUCCINATE 40 MG: 40 INJECTION, POWDER, FOR SOLUTION INTRAMUSCULAR; INTRAVENOUS at 08:49

## 2024-04-03 RX ADMIN — PANTOPRAZOLE SODIUM 40 MG: 40 TABLET, DELAYED RELEASE ORAL at 10:25

## 2024-04-03 RX ADMIN — FORMOTEROL FUMARATE DIHYDRATE 20 MCG: 20 SOLUTION RESPIRATORY (INHALATION) at 19:07

## 2024-04-03 RX ADMIN — ATENOLOL 25 MG: 25 TABLET ORAL at 20:25

## 2024-04-03 RX ADMIN — ATENOLOL 25 MG: 25 TABLET ORAL at 10:25

## 2024-04-03 RX ADMIN — ANTACID/ANTIFLATULENT 4 G: 380; 550; 10; 10 GRANULE, EFFERVESCENT ORAL at 09:57

## 2024-04-03 RX ADMIN — METHYLPREDNISOLONE SODIUM SUCCINATE 40 MG: 40 INJECTION, POWDER, FOR SOLUTION INTRAMUSCULAR; INTRAVENOUS at 16:06

## 2024-04-03 RX ADMIN — FORMOTEROL FUMARATE DIHYDRATE 20 MCG: 20 SOLUTION RESPIRATORY (INHALATION) at 07:59

## 2024-04-03 RX ADMIN — METHYLPREDNISOLONE SODIUM SUCCINATE 40 MG: 40 INJECTION, POWDER, FOR SOLUTION INTRAMUSCULAR; INTRAVENOUS at 00:02

## 2024-04-03 RX ADMIN — FUROSEMIDE 40 MG: 10 INJECTION, SOLUTION INTRAMUSCULAR; INTRAVENOUS at 16:05

## 2024-04-03 ASSESSMENT — ACTIVITIES OF DAILY LIVING (ADL)
ADLS_ACUITY_SCORE: 24

## 2024-04-03 NOTE — PLAN OF CARE
"Goal Outcome Evaluation:      Plan of Care Reviewed With: patient    Overall Patient Progress: improvingOverall Patient Progress: improving    Outcome Evaluation: Patient down to 1L NC and maintaining O2 >90%. States that SOB is less than yesterday. Dry, frequent cough. Using IS which she states \"seems to help a lot\". Plan for esophogram tomorrow around 1000.    Patient calm and cooperative. A/O x4. VS stable, maintaining O2 sats >90% on 1L NC. Patient does state that SOB has decreased from yesterday and the IS use is helpful. Dyspnea still present on exertion but able to catch her own breath without increasing O2. Lung sounds are diminished, but BARBER clear. Denies chest pain and general pain. Administered solumedrol per orders. Nebs are schedule and completed by RT. Left PIV CDI and saline locked. Chest CT negative for PE. Plan to have esophogram tomorrow at 1000 due to abnormal swallow study today, must be NPO 4hrs prior to test. Continue to monitor and follow POC.     Problem: Adult Inpatient Plan of Care  Goal: Plan of Care Review  Description: The Plan of Care Review/Shift note should be completed every shift.  The Outcome Evaluation is a brief statement about your assessment that the patient is improving, declining, or no change.  This information will be displayed automatically on your shift  note.  Outcome: Progressing  Flowsheets (Taken 4/2/2024 2235)  Outcome Evaluation: Patient down to 1L NC and maintaining O2 >90%. States that SOB is less than yesterday. Dry, frequent cough. Using IS which she states \"seems to help a lot\". Plan for esophogram tomorrow around 1000.  Plan of Care Reviewed With: patient  Overall Patient Progress: improving  Goal: Patient-Specific Goal (Individualized)  Description: You can add care plan individualizations to a care plan. Examples of Individualization might be:  \"Parent requests to be called daily at 9am for status\", \"I have a hard time hearing out of my right ear\", or \"Do " "not touch me to wake me up as it startles  me\".  Outcome: Progressing  Goal: Absence of Hospital-Acquired Illness or Injury  Outcome: Progressing  Intervention: Identify and Manage Fall Risk  Recent Flowsheet Documentation  Taken 4/2/2024 1843 by Kandis Duncan RN  Safety Promotion/Fall Prevention:   assistive device/personal items within reach   clutter free environment maintained   nonskid shoes/slippers when out of bed   patient and family education   room door open   room near nurse's station   room organization consistent   safety round/check completed  Taken 4/2/2024 1642 by Kandis Duncan RN  Safety Promotion/Fall Prevention: safety round/check completed  Taken 4/2/2024 1530 by Kandis Duncan RN  Safety Promotion/Fall Prevention: safety round/check completed  Intervention: Prevent Skin Injury  Recent Flowsheet Documentation  Taken 4/2/2024 1843 by Kandis Duncan RN  Body Position: position changed independently  Skin Protection:   adhesive use limited   pulse oximeter probe site changed   transparent dressing maintained  Device Skin Pressure Protection:   adhesive use limited   tubing/devices free from skin contact  Taken 4/2/2024 1642 by Kandis Duncan RN  Body Position: position changed independently  Taken 4/2/2024 1530 by Kandis Duncan RN  Body Position: position changed independently  Intervention: Prevent and Manage VTE (Venous Thromboembolism) Risk  Recent Flowsheet Documentation  Taken 4/2/2024 1843 by Kandis Duncan RN  VTE Prevention/Management: (Ambulatory) SCDs (sequential compression devices) off  Intervention: Prevent Infection  Recent Flowsheet Documentation  Taken 4/2/2024 1843 by Kandis Duncan RN  Infection Prevention:   equipment surfaces disinfected   hand hygiene promoted   rest/sleep promoted   single patient room provided  Taken 4/2/2024 1642 by Kandis Duncan RN  Infection Prevention:   equipment surfaces disinfected   hand hygiene " promoted   rest/sleep promoted   single patient room provided  Taken 4/2/2024 1530 by Kandis Duncan, RN  Infection Prevention:   equipment surfaces disinfected   hand hygiene promoted   rest/sleep promoted   single patient room provided  Goal: Optimal Comfort and Wellbeing  Outcome: Progressing  Goal: Readiness for Transition of Care  Outcome: Progressing     Problem: Gas Exchange Impaired  Goal: Optimal Gas Exchange  Outcome: Progressing     Problem: Activity Intolerance  Goal: Enhanced Capacity and Energy  Outcome: Progressing  Intervention: Optimize Activity Tolerance  Recent Flowsheet Documentation  Taken 4/2/2024 1843 by Kandis Duncan, RN  Activity Management: activity adjusted per tolerance  Environmental Support:   calm environment promoted   caregiver consistency promoted   environmental consistency promoted   personal routine supported   rest periods encouraged  Taken 4/2/2024 1642 by Kandis Duncan, RN  Activity Management: activity adjusted per tolerance  Taken 4/2/2024 1530 by Kandis Duncan, RN  Activity Management: activity adjusted per tolerance

## 2024-04-03 NOTE — PROGRESS NOTES
[facility]     Hospitalist Progress Note     Assessment & Plan     ASSESSMENT    68F with hx of postnasal drip, chronic cough, hypertension, and dyslipidemia who presents with shortness of breath and worsening cough and found to have acute hypoxic respite failure suspected to be due to multifocal pneumonia and suspected undiagnosed COPD.  Patient also being worked up for dysphagia thought to be of esophageal phase.     Had repeat CT imaging 04/02 with evidence of progressive infiltrates on imaging.  Query if this is due to recurrent aspiration.  Discussed this with SLP and they said that there is significant buildup of food and esophagus after eating.  Will consult GI for possible EGD to investigate further.  Pulmonology also following and can help review imaging.    PLAN    Acute Hypoxic Respiratory Failure  -On adm, needing 2L O2 to keep sats >90%  -Suspect mainly secondary to PNA but also may have component of COPD and being treated for this as well, pulmonology following  -CTA PE study negative 04/02  -Treatment for pathology below and wean O2 as able    Community-Acquired Pneumonia  -Presented with shortness of breath and found to have multifocal infiltrates on imaging  -Has been on treatment for community-acquired pneumonia with ceftriaxone and azithromycin  -WBC normalized but ongoing hypoxia and repeat CT imaging with progressive infiltrates  -Unclear if this actually represents untreated pneumonia vs pulm edema vs other inflammatory process  -Could be due to recurrent aspiration as well  -Will workup further with sputum culture and MRSA nasal swab, pulmonology following and can review imaging as well  PLAN  -Ceftriaxone + Azithromycin (on D5/5 of tx)  -Sputum culture and MRSA nasal swab  -Dysphagia/Aspiration workup below  -Pulmonology following, appreciate recommendations    COPD exacerbation  -Methylprednisolone 40 mg IV every 8hrs  -Scheduled and as needed nebulizers    Chronic cough  "and Post-Nasal Drip  -Chronic issue for patient but worse this admission  -Has been on Flonase with some benefit but still ongoing coughing  PLAN  -On Flonase daily  -Add inhaled nasal antihistamine (Astelin) bid to see if this has any benefit    Dysphagia, Suspected to be of Esophageal Phase  -Has been getting workup for dysphagia this admission  -Suspected to be of esophageal phase and GI workup recommended  -PPI added  -Will consult GI for inpatient workup as it appears pneumonia progressing which could be from recurrent aspiration    Other Issues  -Essential HTN: Home meds  -HLD: Home meds    DVT Prophy  -LMWH    Disposition  -1-2 days to home      Adria Conner MD    Subjective     Seen at bedside.  Still on 1 L of oxygen.  Ongoing dry cough.        Objective   Blood pressure (!) 145/62, pulse 76, temperature 98.2  F (36.8  C), temperature source Oral, resp. rate 16, height 1.702 m (5' 7\"), weight 77.7 kg (171 lb 4.8 oz), SpO2 95%.    PHYSICAL EXAM  General: In no acute distress  CV: RRR.  Lungs: Scattered rhonchi. Nl WOB.  Abd: Non-tender.  Ext: No edema.    LABS AND IMAGING  Reviewed and pertinent results discussed in assessment and plan.   "

## 2024-04-03 NOTE — PLAN OF CARE
"Pt is alert and oriented x 4. Pt denies any pain. Dyspnea on exertion and pt on 1LNC.     IV Solumedrol and Rocephin administered. Ongoing monitoring.    Plan: Esophagram for 10am today; pt to maintain NPO 4 hrs prior to procedure.    BP (!) 145/62 (BP Location: Right arm)   Pulse 76   Temp 98.2  F (36.8  C) (Oral)   Resp 16   Ht 1.702 m (5' 7\")   Wt 77.7 kg (171 lb 4.8 oz)   SpO2 97%   BMI 26.83 kg/m       Problem: Adult Inpatient Plan of Care  Goal: Absence of Hospital-Acquired Illness or Injury  Intervention: Identify and Manage Fall Risk  Recent Flowsheet Documentation  Taken 4/3/2024 0400 by Jose A Espana RN  Safety Promotion/Fall Prevention: safety round/check completed  Taken 4/3/2024 0300 by Jose A Espana RN  Safety Promotion/Fall Prevention: safety round/check completed  Taken 4/3/2024 0200 by Jose A Espana RN  Safety Promotion/Fall Prevention: safety round/check completed  Taken 4/3/2024 0100 by Jose A Espana RN  Safety Promotion/Fall Prevention: safety round/check completed  Taken 4/3/2024 0007 by Jose A Espana RN  Safety Promotion/Fall Prevention:   safety round/check completed   nonskid shoes/slippers when out of bed   assistive device/personal items within reach   clutter free environment maintained  Taken 4/2/2024 2300 by Jose A Espana RN  Safety Promotion/Fall Prevention: safety round/check completed  Intervention: Prevent Skin Injury  Recent Flowsheet Documentation  Taken 4/3/2024 0007 by Jose A Espana RN  Body Position: position changed independently  Intervention: Prevent and Manage VTE (Venous Thromboembolism) Risk  Recent Flowsheet Documentation  Taken 4/3/2024 0007 by Jose A Espana RN  VTE Prevention/Management: (pt is ambulatory) SCDs (sequential compression devices) off  Intervention: Prevent Infection  Recent Flowsheet Documentation  Taken 4/3/2024 0007 by Jose A Espana RN  Infection Prevention:   single patient room provided   rest/sleep promoted   " hand hygiene promoted   equipment surfaces disinfected     Problem: Gas Exchange Impaired  Goal: Optimal Gas Exchange  Intervention: Optimize Oxygenation and Ventilation  Recent Flowsheet Documentation  Taken 4/3/2024 0007 by Jose A Espana RN  Head of Bed (HOB) Positioning: HOB at 30-45 degrees   Goal Outcome Evaluation: Pt on 1LNC.                   Problem: Adult Inpatient Plan of Care  Goal: Absence of Hospital-Acquired Illness or Injury  Intervention: Identify and Manage Fall Risk  Recent Flowsheet Documentation  Taken 4/3/2024 0400 by Jose A Espana RN  Safety Promotion/Fall Prevention: safety round/check completed  Taken 4/3/2024 0300 by Jose A Espana RN  Safety Promotion/Fall Prevention: safety round/check completed  Taken 4/3/2024 0200 by Jose A Espana RN  Safety Promotion/Fall Prevention: safety round/check completed  Taken 4/3/2024 0100 by Jose A Espana RN  Safety Promotion/Fall Prevention: safety round/check completed  Taken 4/3/2024 0007 by Jose A Espana RN  Safety Promotion/Fall Prevention:   safety round/check completed   nonskid shoes/slippers when out of bed   assistive device/personal items within reach   clutter free environment maintained  Taken 4/2/2024 2300 by Jose A Espana RN  Safety Promotion/Fall Prevention: safety round/check completed  Intervention: Prevent Skin Injury  Recent Flowsheet Documentation  Taken 4/3/2024 0007 by Jose A Espana RN  Body Position: position changed independently  Intervention: Prevent and Manage VTE (Venous Thromboembolism) Risk  Recent Flowsheet Documentation  Taken 4/3/2024 0007 by Jose A Espana RN  VTE Prevention/Management: (pt is ambulatory) SCDs (sequential compression devices) off  Intervention: Prevent Infection  Recent Flowsheet Documentation  Taken 4/3/2024 0007 by Jose A Espana RN  Infection Prevention:   single patient room provided   rest/sleep promoted   hand hygiene promoted   equipment surfaces disinfected      Problem: Gas Exchange Impaired  Goal: Optimal Gas Exchange  Intervention: Optimize Oxygenation and Ventilation  Recent Flowsheet Documentation  Taken 4/3/2024 0007 by Jose A Espana RN  Head of Bed (HOB) Positioning: HOB at 30-45 degrees

## 2024-04-03 NOTE — CONSULTS
[facility] Chart Update:    Consult received regarding 67 yo woman hospitalized with hypoxic respiratory failure & community acquired pneumonia, who has a history of dysphagia.  There is a question regarding possible aspiration pneumonia, which would suggest oropharyngeal dysfunction/dysphagia.  We were asked to see regarding possibility of concomitant esophageal dysphagia.    Agree with esophagram as ordered for today.  If there is evidence of an esophageal stricture then we could proceed with EGD with dilation tomorrow (patient already eating lunch this afternoon).  If esophagram is unremarkable, or only suggesting esophageal dysmotility, then I would defer EGD at this time (may benefit from outpatient [facility] appt after resolution of pneumonia/COPD exacerbation).    Dameon Hidalgo MD  [facility]

## 2024-04-03 NOTE — PLAN OF CARE
"  A&O x4  Independent in room  Esophagram completed today  MRSA, MRRSA, swab obtained  Resp Culture Sputum sample stills needs to be collected  RA at rest  De-sat with movement but recovers quickly.  Independent in room  Denies pain, N/V, SoB      BP (!) 145/62 (BP Location: Right arm)   Pulse 76   Temp 98.2  F (36.8  C) (Oral)   Resp 16   Ht 1.702 m (5' 7\")   Wt 77.7 kg (171 lb 4.8 oz)   SpO2 95%   BMI 26.83 kg/m      "

## 2024-04-03 NOTE — PROGRESS NOTES
[facility]   INPATIENT PULMONARY/IP PROGRESS NOTE    April 3, 2024         Assessment and Plan:     1. Dyspnea and hypoxia in setting of chronic cough. CT show mild inflammation and in my opinion, out of proportion of his symptoms. TTE was normal. Differential includes AECOPD vs PE on top of an atypical infection. I think the best initial course is to treat for AECOPD via steroids, RTC bronchodilators and antibiotics. If her symptoms do not improve over the next 2-3 days or gets worse, please order CT PE protocol to r/o PE.   - continue current steroid dose, bronchodilators and antibiotics   - CT was neg for PE but does show more ggo's. Infectious cause unlikely in my opinion   - agree with diuresis. Please try net neg 1L/d  - can consider bronchoscopy if no improvement     Billing: I spent a total of 50min for a subsequent hospital visit including review of chart, images, labs and notes (15min), bedside exam and dialogue with the patient (15min), and post-visit documentation, decision making and discussion with primary team and nursing (20min).     Jake Sanchez MD  Associate Professor of Medicine  Section of Interventional Pulmonology   Division of Pulmonary, Allergy, Critical Care and Sleep Medicine   [facility], [employer]  Pager: [phone]   Office: [phone]  Email: [email or web address]          Interval History:     Breathing feels better since Monday           Review of Systems:   12-point ROS was reviewed and normal other than stated above.           Medications:     Current Facility-Administered Medications   Medication Dose Route Frequency Provider Last Rate Last Admin    atenolol (TENORMIN) tablet 25 mg  25 mg Oral BID Olya, Shaw A, DO   25 mg at 04/03/24 1025    atorvastatin (LIPITOR) tablet 10 mg  10 mg Oral Daily Olya, Shaw A, DO   10 mg at 04/03/24 1025    azelastine (ASTELIN) nasal spray 2 spray  2 spray Both Nostrils BID Harshad Stevens MD   2 spray at  04/03/24 1244    cefTRIAXone (ROCEPHIN) 2 g vial to attach to  ml bag for ADULTS or NS 50 ml bag for PEDS  2 g Intravenous Q24H Samantha Peralta MD   2 g at 04/03/24 0538    enoxaparin ANTICOAGULANT (LOVENOX) injection 40 mg  40 mg Subcutaneous Q24H Samantha Peralta MD   40 mg at 04/03/24 1030    fluticasone (FLONASE) 50 MCG/ACT spray 2 spray  2 spray Both Nostrils Daily Olya Shaw A, DO   2 spray at 04/03/24 1024    formoterol (PERFOROMIST) neb solution 20 mcg  20 mcg Nebulization Q12H Jake Sanchez MD   20 mcg at 04/03/24 0759    furosemide (LASIX) injection 40 mg  40 mg Intravenous Q12H Harshad Stevens MD        ipratropium - albuterol 0.5 mg/2.5 mg/3 mL (DUONEB) neb solution 3 mL  3 mL Nebulization 4x daily Jake Sanchez MD   3 mL at 04/03/24 1154    losartan-hydrochlorothiazide (HYZAAR) 50-12.5 MG per tablet 1 tablet  1 tablet Oral Daily Olya, Shaw A, DO   1 tablet at 04/03/24 1025    methylPREDNISolone sodium succinate (SOLU-MEDROL) injection 40 mg  40 mg Intravenous Q8H Jake Sanchez MD   40 mg at 04/03/24 0849    pantoprazole (PROTONIX) EC tablet 40 mg  40 mg Oral Daily OlyaShaw man A, DO   40 mg at 04/03/24 1025    sodium chloride (PF) 0.9% PF flush 3 mL  3 mL Intracatheter Q8H Samantha Peralta MD   3 mL at 04/03/24 0537     Current Facility-Administered Medications   Medication Dose Route Frequency Provider Last Rate Last Admin    acetaminophen (TYLENOL) tablet 650 mg  650 mg Oral Q4H PRN Samantha Peralta MD        Or    acetaminophen (TYLENOL) Suppository 650 mg  650 mg Rectal Q4H PRN Samantha Peralta MD        calcium carbonate (TUMS) chewable tablet 1,000 mg  1,000 mg Oral 4x Daily PRN Samantha Peralta MD        ipratropium - albuterol 0.5 mg/2.5 mg/3 mL (DUONEB) neb solution 3 mL  3 mL Nebulization 4x Daily PRN Samantha Peralta MD        levalbuterol (XOPENEX) neb solution 1.25 mg  1.25 mg Nebulization Q4H PRN Jake Sanchez MD        lidocaine (LMX4) cream   Topical Q1H  Samantha Ramírez MD        lidocaine 1 % 0.1-1 mL  0.1-1 mL Other Q1H PRN Samantha Peralta MD        ondansetron (ZOFRAN ODT) ODT tab 4 mg  4 mg Oral Q6H PRSamantha Parks MD        Or    ondansetron (ZOFRAN) injection 4 mg  4 mg Intravenous Q6H PRSamantha Parks MD        prochlorperazine (COMPAZINE) injection 5 mg  5 mg Intravenous Q6H PRSamantha Parks MD        Or    prochlorperazine (COMPAZINE) tablet 5 mg  5 mg Oral Q6H PRSamantha Parks MD        Or    prochlorperazine (COMPAZINE) suppository 12.5 mg  12.5 mg Rectal Q12H PRSamantha Parks MD        senna-docusate (SENOKOT-S/PERICOLACE) 8.6-50 MG per tablet 1 tablet  1 tablet Oral BID PRSamantha Parks MD        Or    senna-docusate (SENOKOT-S/PERICOLACE) 8.6-50 MG per tablet 2 tablet  2 tablet Oral BID PRSamantha Parks MD        sodium chloride (PF) 0.9% PF flush 3 mL  3 mL Intracatheter q1 min prSamantha Parks MD   3 mL at 24 1643            Physical Exam:   Temp:  [97.7  F (36.5  C)-98.2  F (36.8  C)] 98.2  F (36.8  C)  Pulse:  [63-82] 76  Resp:  [16-18] 16  BP: (139-145)/(62-69) 145/62  SpO2:  [95 %-97 %] 95 %    Intake/Output Summary (Last 24 hours) at 4/3/2024 1524  Last data filed at 2024 2129  Gross per 24 hour   Intake 209 ml   Output --   Net 209 ml     Temp:  [97.7  F (36.5  C)-98.2  F (36.8  C)] 98.2  F (36.8  C)  Pulse:  [63-82] 76  Resp:  [16-18] 16  BP: (139-145)/(62-69) 145/62  SpO2:  [95 %-97 %] 95 %    Intake/Output Summary (Last 24 hours) at 4/3/2024 1524  Last data filed at 2024  Gross per 24 hour   Intake 209 ml   Output --   Net 209 ml     Temp  Av  F (37.2  C)  Min: 97.7  F (36.5  C)  Max: 100.1  F (37.8  C)      Pulse  Av.2  Min: 58  Max: 100 Resp  Av.3  Min: 9  Max: 26  SpO2  Av.3 %  Min: 85 %  Max: 98 %         Intake/Output Summary (Last 24 hours) at 4/3/2024 1524  Last data filed at 2024  Gross per 24 hour   Intake 209 ml   Output --   Net 209 ml     Wt Readings  from Last 4 Encounters:   03/30/24 77.7 kg (171 lb 4.8 oz)   07/25/23 73.9 kg (163 lb)       Constitutional:   Awake, alert and in no apparent distress     Eyes:   nonicteric     ENT:    oral mucosa moist without lesions     Neck:   Supple without supraclavicular or cervical lymphadenopathy     Lungs:   Good air flow.  No crackles. No rhonchi.  basilar wheezes.     Cardiovascular:   Normal S1 and S2.  RRR.  No murmur, gallop or rub.     Abdomen:   NABS, soft, nontender, nondistended.  No HSM.     Musculoskeletal:   No edema     Neurologic:   Alert and conversant.     Skin:   Warm, dry.  No rash on limited exam.             Current Laboratory Data:   All laboratory and imaging data reviewed.    Results for orders placed or performed during the hospital encounter of 03/30/24 (from the past 24 hour(s))   CBC with platelets   Result Value Ref Range    WBC Count 11.7 (H) 4.0 - 11.0 10e3/uL    RBC Count 3.77 (L) 3.80 - 5.20 10e6/uL    Hemoglobin 11.9 11.7 - 15.7 g/dL    Hematocrit 35.3 35.0 - 47.0 %    MCV 94 78 - 100 fL    MCH 31.6 26.5 - 33.0 pg    MCHC 33.7 31.5 - 36.5 g/dL    RDW 11.9 10.0 - 15.0 %    Platelet Count 234 150 - 450 10e3/uL   XR Esophagram    Narrative    ESOPHAGRAM 4/3/2024 10:08 AM     HISTORY: dysphagia, abnormal video swallow. Evaluate for cause of  chronic cough    COMPARISON: None.    TECHNIQUE: A double contrast esophagram was performed.     FLUOROSCOPY TIME: 2.6 minutes.    FINDINGS: There are no mucosal abnormalities. There are no strictures,  webs, or filling defects. There was no reflux seen. No hiatal hernia.      Impression    IMPRESSION: Negative esophagram.    GERALD ESPINOZA MD         SYSTEM ID:  [id number]

## 2024-04-04 LAB
ANION GAP SERPL CALCULATED.3IONS-SCNC: 15 MMOL/L (ref 7–15)
BUN SERPL-MCNC: 35.9 MG/DL (ref 8–23)
CALCIUM SERPL-MCNC: 9.5 MG/DL (ref 8.8–10.2)
CHLORIDE SERPL-SCNC: 94 MMOL/L (ref 98–107)
CREAT SERPL-MCNC: 1.06 MG/DL (ref 0.51–0.95)
DEPRECATED HCO3 PLAS-SCNC: 30 MMOL/L (ref 22–29)
EGFRCR SERPLBLD CKD-EPI 2021: 57 ML/MIN/1.73M2
GLUCOSE SERPL-MCNC: 125 MG/DL (ref 70–99)
MAGNESIUM SERPL-MCNC: 2.2 MG/DL (ref 1.7–2.3)
POTASSIUM SERPL-SCNC: 4 MMOL/L (ref 3.4–5.3)
SODIUM SERPL-SCNC: 139 MMOL/L (ref 135–145)

## 2024-04-04 PROCEDURE — 80048 BASIC METABOLIC PNL TOTAL CA: CPT | Performed by: INTERNAL MEDICINE

## 2024-04-04 PROCEDURE — 94640 AIRWAY INHALATION TREATMENT: CPT | Mod: 76

## 2024-04-04 PROCEDURE — 250N000011 HC RX IP 250 OP 636: Performed by: INTERNAL MEDICINE

## 2024-04-04 PROCEDURE — 83735 ASSAY OF MAGNESIUM: CPT | Performed by: INTERNAL MEDICINE

## 2024-04-04 PROCEDURE — 36415 COLL VENOUS BLD VENIPUNCTURE: CPT | Performed by: INTERNAL MEDICINE

## 2024-04-04 PROCEDURE — 250N000009 HC RX 250: Performed by: INTERNAL MEDICINE

## 2024-04-04 PROCEDURE — 999N000157 HC STATISTIC RCP TIME EA 10 MIN

## 2024-04-04 PROCEDURE — 120N000001 HC R&B MED SURG/OB

## 2024-04-04 PROCEDURE — 250N000013 HC RX MED GY IP 250 OP 250 PS 637: Performed by: HOSPITALIST

## 2024-04-04 PROCEDURE — 99233 SBSQ HOSP IP/OBS HIGH 50: CPT | Performed by: INTERNAL MEDICINE

## 2024-04-04 RX ORDER — FUROSEMIDE 10 MG/ML
40 INJECTION INTRAMUSCULAR; INTRAVENOUS ONCE
Qty: 4 ML | Refills: 0 | Status: COMPLETED | OUTPATIENT
Start: 2024-04-04 | End: 2024-04-04

## 2024-04-04 RX ADMIN — FUROSEMIDE 40 MG: 10 INJECTION, SOLUTION INTRAMUSCULAR; INTRAVENOUS at 02:56

## 2024-04-04 RX ADMIN — FORMOTEROL FUMARATE DIHYDRATE 20 MCG: 20 SOLUTION RESPIRATORY (INHALATION) at 19:08

## 2024-04-04 RX ADMIN — FLUTICASONE PROPIONATE 2 SPRAY: 50 SPRAY, METERED NASAL at 09:30

## 2024-04-04 RX ADMIN — METHYLPREDNISOLONE SODIUM SUCCINATE 40 MG: 40 INJECTION, POWDER, FOR SOLUTION INTRAMUSCULAR; INTRAVENOUS at 16:12

## 2024-04-04 RX ADMIN — IPRATROPIUM BROMIDE AND ALBUTEROL SULFATE 3 ML: .5; 3 SOLUTION RESPIRATORY (INHALATION) at 08:13

## 2024-04-04 RX ADMIN — LOSARTAN POTASSIUM AND HYDROCHLOROTHIAZIDE 1 TABLET: 50; 12.5 TABLET, FILM COATED ORAL at 09:20

## 2024-04-04 RX ADMIN — METHYLPREDNISOLONE SODIUM SUCCINATE 40 MG: 40 INJECTION, POWDER, FOR SOLUTION INTRAMUSCULAR; INTRAVENOUS at 09:21

## 2024-04-04 RX ADMIN — ENOXAPARIN SODIUM 40 MG: 40 INJECTION SUBCUTANEOUS at 09:23

## 2024-04-04 RX ADMIN — PANTOPRAZOLE SODIUM 40 MG: 40 TABLET, DELAYED RELEASE ORAL at 09:20

## 2024-04-04 RX ADMIN — ATENOLOL 25 MG: 25 TABLET ORAL at 20:31

## 2024-04-04 RX ADMIN — CEFTRIAXONE 2 G: 2 INJECTION, POWDER, FOR SOLUTION INTRAMUSCULAR; INTRAVENOUS at 06:12

## 2024-04-04 RX ADMIN — ATENOLOL 25 MG: 25 TABLET ORAL at 09:20

## 2024-04-04 RX ADMIN — FUROSEMIDE 40 MG: 10 INJECTION, SOLUTION INTRAMUSCULAR; INTRAVENOUS at 12:01

## 2024-04-04 RX ADMIN — ATORVASTATIN CALCIUM 10 MG: 10 TABLET, FILM COATED ORAL at 09:20

## 2024-04-04 RX ADMIN — IPRATROPIUM BROMIDE AND ALBUTEROL SULFATE 3 ML: .5; 3 SOLUTION RESPIRATORY (INHALATION) at 19:08

## 2024-04-04 RX ADMIN — IPRATROPIUM BROMIDE AND ALBUTEROL SULFATE 3 ML: .5; 3 SOLUTION RESPIRATORY (INHALATION) at 16:40

## 2024-04-04 RX ADMIN — AZELASTINE 2 SPRAY: 1 SPRAY, METERED NASAL at 09:30

## 2024-04-04 RX ADMIN — AZELASTINE 2 SPRAY: 1 SPRAY, METERED NASAL at 20:31

## 2024-04-04 RX ADMIN — IPRATROPIUM BROMIDE AND ALBUTEROL SULFATE 3 ML: .5; 3 SOLUTION RESPIRATORY (INHALATION) at 12:39

## 2024-04-04 RX ADMIN — METHYLPREDNISOLONE SODIUM SUCCINATE 40 MG: 40 INJECTION, POWDER, FOR SOLUTION INTRAMUSCULAR; INTRAVENOUS at 00:04

## 2024-04-04 RX ADMIN — FORMOTEROL FUMARATE DIHYDRATE 20 MCG: 20 SOLUTION RESPIRATORY (INHALATION) at 08:13

## 2024-04-04 ASSESSMENT — ACTIVITIES OF DAILY LIVING (ADL)
ADLS_ACUITY_SCORE: 25
ADLS_ACUITY_SCORE: 24
ADLS_ACUITY_SCORE: 25
ADLS_ACUITY_SCORE: 25
ADLS_ACUITY_SCORE: 24
ADLS_ACUITY_SCORE: 24
ADLS_ACUITY_SCORE: 25
ADLS_ACUITY_SCORE: 24
ADLS_ACUITY_SCORE: 24
ADLS_ACUITY_SCORE: 25
ADLS_ACUITY_SCORE: 24

## 2024-04-04 NOTE — PLAN OF CARE
"VSS, AO x4, denies pain, independent in room, regular diet, NPO at midnight tonight for procedure tomorrow.  Weaned to RA, desats and bradycardia while asleep, apical pulse regular, no noted edema or neuropathy, continent x2. Home O2 assessment today (see flowsheets), EGD scheduled for tomorrow, Lovenox and IV abx discontinued, plan to continue steroids, nebs.    Goal Outcome Evaluation:  Plan of Care Reviewed With: patient, family Overall Patient Progress: improving Outcome Evaluation: home O2 assessment done today, NPO at midnight for EGD tomorrow morning      Problem: Adult Inpatient Plan of Care  Goal: Plan of Care Review  Description: The Plan of Care Review/Shift note should be completed every shift.  The Outcome Evaluation is a brief statement about your assessment that the patient is improving, declining, or no change.  This information will be displayed automatically on your shift  note.  Outcome: Progressing  Flowsheets (Taken 4/4/2024 1418)  Outcome Evaluation: home O2 assessment done today, NPO at midnight for EGD tomorrow morning  Plan of Care Reviewed With:   patient   family  Overall Patient Progress: improving  Goal: Patient-Specific Goal (Individualized)  Description: You can add care plan individualizations to a care plan. Examples of Individualization might be:  \"Parent requests to be called daily at 9am for status\", \"I have a hard time hearing out of my right ear\", or \"Do not touch me to wake me up as it startles  me\".  Outcome: Progressing  Goal: Absence of Hospital-Acquired Illness or Injury  Outcome: Progressing  Intervention: Identify and Manage Fall Risk  Recent Flowsheet Documentation  Taken 4/4/2024 0900 by Shellie Henson RN  Safety Promotion/Fall Prevention:   safety round/check completed   room organization consistent  Goal: Optimal Comfort and Wellbeing  Outcome: Progressing  Goal: Readiness for Transition of Care  Outcome: Progressing     Problem: Gas Exchange Impaired  Goal: Optimal Gas " Exchange  Outcome: Progressing     Problem: Activity Intolerance  Goal: Enhanced Capacity and Energy  Outcome: Progressing

## 2024-04-04 NOTE — PLAN OF CARE
"AO x4. Denies pain. Sputum sample still needed but pt not producing sputum w/ cough, pt aware. O2 NC available for bedtime. Independent in room. Plan: NPO midnight for EGD tomorrow      Problem: Adult Inpatient Plan of Care  Goal: Plan of Care Review  Description: The Plan of Care Review/Shift note should be completed every shift.  The Outcome Evaluation is a brief statement about your assessment that the patient is improving, declining, or no change.  This information will be displayed automatically on your shift  note.  Outcome: Progressing  Flowsheets (Taken 4/4/2024 1728)  Outcome Evaluation: NPO midnight for EGD tomorrow, continue POC  Plan of Care Reviewed With: patient  Overall Patient Progress: no change  Goal: Patient-Specific Goal (Individualized)  Description: You can add care plan individualizations to a care plan. Examples of Individualization might be:  \"Parent requests to be called daily at 9am for status\", \"I have a hard time hearing out of my right ear\", or \"Do not touch me to wake me up as it startles  me\".  Outcome: Progressing  Goal: Absence of Hospital-Acquired Illness or Injury  Outcome: Progressing  Intervention: Identify and Manage Fall Risk  Recent Flowsheet Documentation  Taken 4/4/2024 1620 by Alana Stafford RN  Safety Promotion/Fall Prevention:   safety round/check completed   room organization consistent   room near nurse's station   nonskid shoes/slippers when out of bed   lighting adjusted  Intervention: Prevent Skin Injury  Recent Flowsheet Documentation  Taken 4/4/2024 1620 by Alana Stafford RN  Body Position: position changed independently  Intervention: Prevent Infection  Recent Flowsheet Documentation  Taken 4/4/2024 1620 by Alana Stafford RN  Infection Prevention:   rest/sleep promoted   single patient room provided  Goal: Optimal Comfort and Wellbeing  Outcome: Progressing  Goal: Readiness for Transition of Care  Outcome: Progressing     Problem: Gas Exchange Impaired  Goal: " Optimal Gas Exchange  Outcome: Progressing  Intervention: Optimize Oxygenation and Ventilation  Recent Flowsheet Documentation  Taken 4/4/2024 1620 by Alana Stafford, RN  Head of Bed (HOB) Positioning: HOB at 30-45 degrees     Problem: Activity Intolerance  Goal: Enhanced Capacity and Energy  Outcome: Progressing  Intervention: Optimize Activity Tolerance  Recent Flowsheet Documentation  Taken 4/4/2024 1620 by Alana Stafford, RN  Activity Management: up ad judit   Goal Outcome Evaluation:      Plan of Care Reviewed With: patient    Overall Patient Progress: no changeOverall Patient Progress: no change    Outcome Evaluation: NPO midnight for EGD tomorrow, continue POC

## 2024-04-04 NOTE — PLAN OF CARE
"[facility] care [id number]. A&Ox4. Up ad judit when OOB. On RA with O2 sats in the 90s. On a regular diet but NPO in preparation for possible EGD today 4/4. PIV in R arm saline locked. No reports of pan/discomfort. Plan of care ongoing.     Problem: Adult Inpatient Plan of Care  Goal: Plan of Care Review  Description: The Plan of Care Review/Shift note should be completed every shift.  The Outcome Evaluation is a brief statement about your assessment that the patient is improving, declining, or no change.  This information will be displayed automatically on your shift  note.  Outcome: Progressing  Flowsheets (Taken 4/4/2024 0246)  Outcome Evaluation: Infrequent nonproductive cough, NPO in preparation for possible EGD tomorrow 4/4, no pain reported, decreased SOB per patient reports, weaned to RA with continuous pulse oximeter in place  Plan of Care Reviewed With: patient  Overall Patient Progress: improving  Goal: Patient-Specific Goal (Individualized)  Description: You can add care plan individualizations to a care plan. Examples of Individualization might be:  \"Parent requests to be called daily at 9am for status\", \"I have a hard time hearing out of my right ear\", or \"Do not touch me to wake me up as it startles  me\".  Outcome: Progressing  Goal: Absence of Hospital-Acquired Illness or Injury  Outcome: Progressing  Intervention: Identify and Manage Fall Risk  Recent Flowsheet Documentation  Taken 4/4/2024 0006 by Cecy Mohan, RN  Safety Promotion/Fall Prevention: safety round/check completed  Taken 4/3/2024 2025 by Ccey Mohan, RN  Safety Promotion/Fall Prevention:   lighting adjusted   nonskid shoes/slippers when out of bed   safety round/check completed  Intervention: Prevent Skin Injury  Recent Flowsheet Documentation  Taken 4/3/2024 2025 by Cecy Mohan, RN  Body Position: position changed independently  Intervention: Prevent and Manage VTE (Venous Thromboembolism) Risk  Recent Flowsheet " Documentation  Taken 4/3/2024 2025 by Cecy Mohan, RN  VTE Prevention/Management: (Ambulatory) SCDs (sequential compression devices) off  Goal: Optimal Comfort and Wellbeing  Outcome: Progressing  Goal: Readiness for Transition of Care  Outcome: Progressing     Problem: Gas Exchange Impaired  Goal: Optimal Gas Exchange  Outcome: Progressing  Intervention: Optimize Oxygenation and Ventilation  Recent Flowsheet Documentation  Taken 4/3/2024 2025 by Cecy Mohan, RN  Head of Bed (HOB) Positioning: HOB at 30-45 degrees     Problem: Activity Intolerance  Goal: Enhanced Capacity and Energy  Outcome: Progressing  Intervention: Optimize Activity Tolerance  Recent Flowsheet Documentation  Taken 4/3/2024 2025 by Cecy Mohan, RN  Activity Management: up ad judit       Goal Outcome Evaluation:      Plan of Care Reviewed With: patient    Overall Patient Progress: improvingOverall Patient Progress: improving    Outcome Evaluation: Infrequent nonproductive cough, NPO in preparation for possible EGD tomorrow 4/4, no pain reported, decreased SOB per patient reports, weaned to RA with continuous pulse oximeter in place

## 2024-04-04 NOTE — PROGRESS NOTES
[facility]     Hospitalist Progress Note     Assessment & Plan     ASSESSMENT    68F with hx of postnasal drip, chronic cough, hypertension, and dyslipidemia who presents with shortness of breath and worsening cough and found to have acute hypoxic respite failure suspected to be due to multifocal pneumonia and suspected undiagnosed COPD.  Patient also being worked up for dysphagia thought to be of esophageal phase.     No improvement with IV diuresis. Discussed with pulm yest, likely to go for bronch tomorrow.    PLAN    Acute Hypoxic Respiratory Failure  -On adm, needing 2L O2 to keep sats >90%  -Suspect mainly secondary to PNA but also may have component of COPD and being treated for this as well, pulmonology following  -CTA PE study negative 04/02  -Trialed IV diuresis yesterday without benefit in breathing  -Weaned off O2 at rest, will complete walk test    Community-Acquired Pneumonia  -Presented with shortness of breath and found to have multifocal infiltrates on imaging  -Has been on treatment for community-acquired pneumonia with ceftriaxone and azithromycin  -WBC normalized but ongoing hypoxia and repeat CT imaging with progressive infiltrates  -Unclear if this actually represents untreated pneumonia vs pulm edema vs other inflammatory process  -Could be due to recurrent aspiration as well  -Pulmonology following, NPO for bronch tomorrow  PLAN  -S/p 5d of Ceftriaxone + Azithromycin   -Sputum culture pending  -Pulmonology following, NPO for bronch tomorrow    COPD exacerbation  -Methylprednisolone 40 mg IV every 8hrs  -Scheduled and as needed nebulizers    Chronic cough and Post-Nasal Drip  -Chronic issue for patient but worse this admission  -Has been on Flonase with some benefit but still ongoing coughing  PLAN  -On Flonase daily  -Added inhaled nasal antihistamine (Astelin) bid to see if this has any benefit    Dysphagia, Suspected to be of Esophageal Phase  -Has been getting workup for  "dysphagia this admission  -Suspected to be of esophageal phase and GI workup recommended  -Esophogram negative  -GI consulted, recommend outpatient follow-up for further workup  -PPI added    Other Issues  -Essential HTN: Home meds  -HLD: Home meds  -Acute Kidney Injury: Hold diuretics    DVT Prophy  -LMWH    Disposition  -1-2 days to home      Adria Conner MD    Subjective     Seen at bedside. No real improvement in breathing over last 24hrs. Discussed with pulm yest, likely to go for bronch tomorrow.        Objective   Blood pressure 128/59, pulse 61, temperature 98.1  F (36.7  C), temperature source Oral, resp. rate 16, height 1.702 m (5' 7\"), weight 77.7 kg (171 lb 4.8 oz), SpO2 95%.    PHYSICAL EXAM  General: In no acute distress  CV: RRR.  Lungs: Scattered rhonchi. Nl WOB.  Abd: Non-tender.  Ext: No edema.    LABS AND IMAGING  Reviewed and pertinent results discussed in assessment and plan.   "

## 2024-04-05 VITALS
WEIGHT: 166.3 LBS | HEIGHT: 67 IN | BODY MASS INDEX: 26.1 KG/M2 | OXYGEN SATURATION: 92 % | HEART RATE: 59 BPM | SYSTOLIC BLOOD PRESSURE: 168 MMHG | DIASTOLIC BLOOD PRESSURE: 73 MMHG | TEMPERATURE: 98 F | RESPIRATION RATE: 18 BRPM

## 2024-04-05 LAB
ANION GAP SERPL CALCULATED.3IONS-SCNC: 13 MMOL/L (ref 7–15)
BRONCHOSCOPY: NORMAL
BUN SERPL-MCNC: 42.4 MG/DL (ref 8–23)
CALCIUM SERPL-MCNC: 9.2 MG/DL (ref 8.8–10.2)
CD19 CELLS NFR BRONCH: 15 %
CD3 CELLS NFR BRONCH: 82 %
CD3+CD4+ CELLS NFR BRONCH: 58 %
CD3+CD4+ CELLS/CD3+CD8+ CLL BRONCH: 2.32 %
CD3+CD8+ CELLS NFR BRONCH: 25 %
CD3-CD16+CD56+ CELLS NFR SPEC: 1 %
CHLORIDE SERPL-SCNC: 94 MMOL/L (ref 98–107)
CREAT SERPL-MCNC: 1.27 MG/DL (ref 0.51–0.95)
DEPRECATED HCO3 PLAS-SCNC: 30 MMOL/L (ref 22–29)
EGFRCR SERPLBLD CKD-EPI 2021: 46 ML/MIN/1.73M2
GLUCOSE SERPL-MCNC: 125 MG/DL (ref 70–99)
GRAM STAIN RESULT: NORMAL
GRAM STAIN RESULT: NORMAL
KOH PREPARATION: NORMAL
KOH PREPARATION: NORMAL
LYMPHOCYTE SUBSET BRONCHIAL EXTERNAL COMMENT: NORMAL
PLATELET # BLD AUTO: 224 10E3/UL (ref 150–450)
POTASSIUM SERPL-SCNC: 3.7 MMOL/L (ref 3.4–5.3)
SODIUM SERPL-SCNC: 137 MMOL/L (ref 135–145)

## 2024-04-05 PROCEDURE — 94640 AIRWAY INHALATION TREATMENT: CPT | Mod: 76

## 2024-04-05 PROCEDURE — 94640 AIRWAY INHALATION TREATMENT: CPT

## 2024-04-05 PROCEDURE — 80048 BASIC METABOLIC PNL TOTAL CA: CPT | Performed by: INTERNAL MEDICINE

## 2024-04-05 PROCEDURE — 31624 DX BRONCHOSCOPE/LAVAGE: CPT | Performed by: INTERNAL MEDICINE

## 2024-04-05 PROCEDURE — 86356 MONONUCLEAR CELL ANTIGEN: CPT | Performed by: INTERNAL MEDICINE

## 2024-04-05 PROCEDURE — 250N000013 HC RX MED GY IP 250 OP 250 PS 637: Performed by: HOSPITALIST

## 2024-04-05 PROCEDURE — 250N000011 HC RX IP 250 OP 636: Performed by: INTERNAL MEDICINE

## 2024-04-05 PROCEDURE — 87116 MYCOBACTERIA CULTURE: CPT | Performed by: INTERNAL MEDICINE

## 2024-04-05 PROCEDURE — 87210 SMEAR WET MOUNT SALINE/INK: CPT | Performed by: INTERNAL MEDICINE

## 2024-04-05 PROCEDURE — 250N000009 HC RX 250: Performed by: INTERNAL MEDICINE

## 2024-04-05 PROCEDURE — 99232 SBSQ HOSP IP/OBS MODERATE 35: CPT | Performed by: INTERNAL MEDICINE

## 2024-04-05 PROCEDURE — 0B9D8ZX DRAINAGE OF RIGHT MIDDLE LUNG LOBE, VIA NATURAL OR ARTIFICIAL OPENING ENDOSCOPIC, DIAGNOSTIC: ICD-10-PCS | Performed by: INTERNAL MEDICINE

## 2024-04-05 PROCEDURE — 89050 BODY FLUID CELL COUNT: CPT | Performed by: INTERNAL MEDICINE

## 2024-04-05 PROCEDURE — 87206 SMEAR FLUORESCENT/ACID STAI: CPT | Performed by: INTERNAL MEDICINE

## 2024-04-05 PROCEDURE — 99239 HOSP IP/OBS DSCHRG MGMT >30: CPT | Performed by: INTERNAL MEDICINE

## 2024-04-05 PROCEDURE — 999N000099 HC STATISTIC MODERATE SEDATION < 10 MIN: Performed by: INTERNAL MEDICINE

## 2024-04-05 PROCEDURE — 85049 AUTOMATED PLATELET COUNT: CPT | Performed by: HOSPITALIST

## 2024-04-05 PROCEDURE — 999N000157 HC STATISTIC RCP TIME EA 10 MIN

## 2024-04-05 PROCEDURE — 87102 FUNGUS ISOLATION CULTURE: CPT | Performed by: INTERNAL MEDICINE

## 2024-04-05 PROCEDURE — 87205 SMEAR GRAM STAIN: CPT | Performed by: INTERNAL MEDICINE

## 2024-04-05 PROCEDURE — 87070 CULTURE OTHR SPECIMN AEROBIC: CPT | Performed by: INTERNAL MEDICINE

## 2024-04-05 PROCEDURE — 36415 COLL VENOUS BLD VENIPUNCTURE: CPT | Performed by: HOSPITALIST

## 2024-04-05 RX ORDER — FLUTICASONE FUROATE, UMECLIDINIUM BROMIDE AND VILANTEROL TRIFENATATE 200; 62.5; 25 UG/1; UG/1; UG/1
1 POWDER RESPIRATORY (INHALATION) DAILY
Qty: 1 EACH | Refills: 6 | Status: SHIPPED | OUTPATIENT
Start: 2024-04-05 | End: 2024-04-05

## 2024-04-05 RX ORDER — LIDOCAINE HYDROCHLORIDE 20 MG/ML
SOLUTION OROPHARYNGEAL PRN
Status: DISCONTINUED | OUTPATIENT
Start: 2024-04-05 | End: 2024-04-05 | Stop reason: HOSPADM

## 2024-04-05 RX ORDER — FUROSEMIDE 20 MG
20 TABLET ORAL
Qty: 30 TABLET | Refills: 0 | Status: SHIPPED | OUTPATIENT
Start: 2024-04-05

## 2024-04-05 RX ORDER — IPRATROPIUM BROMIDE AND ALBUTEROL SULFATE 2.5; .5 MG/3ML; MG/3ML
SOLUTION RESPIRATORY (INHALATION) PRN
Status: DISCONTINUED | OUTPATIENT
Start: 2024-04-05 | End: 2024-04-05 | Stop reason: HOSPADM

## 2024-04-05 RX ORDER — LIDOCAINE HYDROCHLORIDE 40 MG/ML
INJECTION, SOLUTION RETROBULBAR PRN
Status: DISCONTINUED | OUTPATIENT
Start: 2024-04-05 | End: 2024-04-05 | Stop reason: HOSPADM

## 2024-04-05 RX ORDER — FENTANYL CITRATE 50 UG/ML
INJECTION, SOLUTION INTRAMUSCULAR; INTRAVENOUS PRN
Status: DISCONTINUED | OUTPATIENT
Start: 2024-04-05 | End: 2024-04-05 | Stop reason: HOSPADM

## 2024-04-05 RX ORDER — MAGNESIUM HYDROXIDE 1200 MG/15ML
LIQUID ORAL PRN
Status: DISCONTINUED | OUTPATIENT
Start: 2024-04-05 | End: 2024-04-05 | Stop reason: HOSPADM

## 2024-04-05 RX ORDER — ALBUTEROL SULFATE 90 UG/1
2 AEROSOL, METERED RESPIRATORY (INHALATION) EVERY 6 HOURS PRN
Qty: 18 G | Refills: 6 | Status: SHIPPED | OUTPATIENT
Start: 2024-04-05

## 2024-04-05 RX ORDER — PREDNISONE 10 MG/1
10 TABLET ORAL DAILY
Qty: 200 TABLET | Refills: 0 | Status: SHIPPED | OUTPATIENT
Start: 2024-04-05

## 2024-04-05 RX ADMIN — FORMOTEROL FUMARATE DIHYDRATE 20 MCG: 20 SOLUTION RESPIRATORY (INHALATION) at 08:06

## 2024-04-05 RX ADMIN — METHYLPREDNISOLONE SODIUM SUCCINATE 40 MG: 40 INJECTION, POWDER, FOR SOLUTION INTRAMUSCULAR; INTRAVENOUS at 08:48

## 2024-04-05 RX ADMIN — METHYLPREDNISOLONE SODIUM SUCCINATE 40 MG: 40 INJECTION, POWDER, FOR SOLUTION INTRAMUSCULAR; INTRAVENOUS at 15:30

## 2024-04-05 RX ADMIN — IPRATROPIUM BROMIDE AND ALBUTEROL SULFATE 3 ML: .5; 3 SOLUTION RESPIRATORY (INHALATION) at 08:06

## 2024-04-05 RX ADMIN — PANTOPRAZOLE SODIUM 40 MG: 40 TABLET, DELAYED RELEASE ORAL at 08:48

## 2024-04-05 RX ADMIN — IPRATROPIUM BROMIDE AND ALBUTEROL SULFATE 3 ML: .5; 3 SOLUTION RESPIRATORY (INHALATION) at 16:01

## 2024-04-05 RX ADMIN — AZELASTINE 2 SPRAY: 1 SPRAY, METERED NASAL at 11:14

## 2024-04-05 RX ADMIN — LOSARTAN POTASSIUM AND HYDROCHLOROTHIAZIDE 1 TABLET: 50; 12.5 TABLET, FILM COATED ORAL at 08:56

## 2024-04-05 RX ADMIN — IPRATROPIUM BROMIDE AND ALBUTEROL SULFATE 3 ML: .5; 3 SOLUTION RESPIRATORY (INHALATION) at 11:56

## 2024-04-05 RX ADMIN — METHYLPREDNISOLONE SODIUM SUCCINATE 40 MG: 40 INJECTION, POWDER, FOR SOLUTION INTRAMUSCULAR; INTRAVENOUS at 00:18

## 2024-04-05 RX ADMIN — ATORVASTATIN CALCIUM 10 MG: 10 TABLET, FILM COATED ORAL at 08:48

## 2024-04-05 RX ADMIN — ATENOLOL 25 MG: 25 TABLET ORAL at 08:48

## 2024-04-05 RX ADMIN — FLUTICASONE PROPIONATE 2 SPRAY: 50 SPRAY, METERED NASAL at 11:14

## 2024-04-05 ASSESSMENT — ACTIVITIES OF DAILY LIVING (ADL)
ADLS_ACUITY_SCORE: 25
ADLS_ACUITY_SCORE: 24
ADLS_ACUITY_SCORE: 25
ADLS_ACUITY_SCORE: 25
ADLS_ACUITY_SCORE: 24
ADLS_ACUITY_SCORE: 25
ADLS_ACUITY_SCORE: 25
ADLS_ACUITY_SCORE: 24
ADLS_ACUITY_SCORE: 25
ADLS_ACUITY_SCORE: 24
ADLS_ACUITY_SCORE: 25
ADLS_ACUITY_SCORE: 25
ADLS_ACUITY_SCORE: 24
ADLS_ACUITY_SCORE: 24

## 2024-04-05 NOTE — PROGRESS NOTES
Oxygen Documentation  I certify that this patient, Lydia Johnson has been under my care (or a nurse practitioner or physican's assistant working with me). This is the face-to-face encounter for oxygen medical necessity.      At the time of this encounter, I have reviewed the qualifying testing and have determined that supplemental oxygen is reasonable and necessary and is expected to improve the patient's condition in a home setting.         Patient has continued oxygen desaturation due to COPD J44.9.    If portability is ordered, is the patient mobile within the home? yes    Was this visit performed as a telehealth visit: No    {DME TeleHealth Reminder (Optional) For telehealth visits to be accepted, there must be a valid reason documented as to why an in-person visit could not be completed. If O2 Sat testing is needed, pt must have an in-person visit with nurse or provider.:

## 2024-04-05 NOTE — OR NURSING
Bronchoscopy completed, pt tolerated well, see provider notes for details. Pt OK to resume diet 1 hour post procedure, at approx 2:45pm. Pt monitored for 30 mins post procedure prior to transfer back to 3rd floor. Report called to DELILAH Plata. Pt stable on RA and vitals WNL.

## 2024-04-05 NOTE — PROGRESS NOTES
[facility]   INPATIENT PULMONARY/IP PROGRESS NOTE    April 5, 2024         Assessment and Plan:     1. Dyspnea and hypoxia in setting of chronic cough. CT show mild inflammation and in my opinion, out of proportion of his symptoms. TTE was normal but showed diastolic dysfunction. Repeat CT PE was neg for PE but showed some interval worsening of peripheral ggo despite high dose steroids, antibiotics, bronchodilators and diuresis. It is not clear how much she diuresed however. She has improved overall and is now on room air but does cont to have cough and some dyspnea.   - Plan bronch today to r/o inflammation and infection   - Suspect volume may be an issue if bronch results are unremarkable. I plan to follow-up any positive results with her.   - She will schedule outpatient pulmonary visit soon after discharge   - Start prednisone 60 and can taper by 5mg every 2 weeks until she see's pulmonary   - Recommend ICS/LABA and MARJORIE inhalers on discharge. Could try trelegy daily and albuterol prn   - Would recommend some sort of loop diuretic regimen as well   - Ok to discharge anytime after bronch from my standpoint     Billing: I spent a total of 50min for a subsequent hospital visit including review of chart, images, labs and notes (15min), bedside exam and dialogue with the patient (15min), and post-visit documentation, decision making and discussion with primary team and nursing (20min).     Jake Sanchez MD  Associate Professor of Medicine  Section of Interventional Pulmonology   Division of Pulmonary, Allergy, Critical Care and Sleep Medicine   [facility], [place]  Pager: [phone]   Office: [phone]  Email: [email or web address]          Interval History:     Feels breathing is better. Ready to go home           Review of Systems:   12-point ROS was reviewed and normal other than stated above.           Medications:     Current Facility-Administered Medications   Medication Dose Route  Frequency Provider Last Rate Last Admin    atenolol (TENORMIN) tablet 25 mg  25 mg Oral BID Olya, Shaw A, DO   25 mg at 04/05/24 0848    atorvastatin (LIPITOR) tablet 10 mg  10 mg Oral Daily Olya, Shaw A, DO   10 mg at 04/05/24 0848    azelastine (ASTELIN) nasal spray 2 spray  2 spray Both Nostrils BID Harshad Stevens MD   2 spray at 04/05/24 1114    [Held by provider] enoxaparin ANTICOAGULANT (LOVENOX) injection 40 mg  40 mg Subcutaneous Q24H Samantha Peralta MD   40 mg at 04/04/24 0923    fluticasone (FLONASE) 50 MCG/ACT spray 2 spray  2 spray Both Nostrils Daily Olya, Shaw A, DO   2 spray at 04/05/24 1114    formoterol (PERFOROMIST) neb solution 20 mcg  20 mcg Nebulization Q12H Jake Sanchez MD   20 mcg at 04/05/24 0806    ipratropium - albuterol 0.5 mg/2.5 mg/3 mL (DUONEB) neb solution 3 mL  3 mL Nebulization 4x daily Jake Sanchez MD   3 mL at 04/05/24 0806    losartan-hydrochlorothiazide (HYZAAR) 50-12.5 MG per tablet 1 tablet  1 tablet Oral Daily Olya, Shaw A, DO   1 tablet at 04/05/24 0856    methylPREDNISolone sodium succinate (SOLU-MEDROL) injection 40 mg  40 mg Intravenous Q8H Jake Sanchez MD   40 mg at 04/05/24 0848    pantoprazole (PROTONIX) EC tablet 40 mg  40 mg Oral Daily Olya, Shaw A, DO   40 mg at 04/05/24 0848    sodium chloride (PF) 0.9% PF flush 3 mL  3 mL Intracatheter Q8H Samantha Peralta MD   3 mL at 04/05/24 0849     Current Facility-Administered Medications   Medication Dose Route Frequency Provider Last Rate Last Admin    acetaminophen (TYLENOL) tablet 650 mg  650 mg Oral Q4H PRN Samantha Peralta MD        Or    acetaminophen (TYLENOL) Suppository 650 mg  650 mg Rectal Q4H PRN Samantha Peralta MD        calcium carbonate (TUMS) chewable tablet 1,000 mg  1,000 mg Oral 4x Daily PRN Samantha Peralta MD        ipratropium - albuterol 0.5 mg/2.5 mg/3 mL (DUONEB) neb solution 3 mL  3 mL Nebulization 4x Daily PRN Samantha Peralta MD        levalbuterol (XOPENEX)  neb solution 1.25 mg  1.25 mg Nebulization Q4H PRN Jake Sanhcez MD        lidocaine (LMX4) cream   Topical Q1H PRN Samantha Peralta MD        lidocaine 1 % 0.1-1 mL  0.1-1 mL Other Q1H PRN Samantha Peralta MD        ondansetron (ZOFRAN ODT) ODT tab 4 mg  4 mg Oral Q6H PRN Samantha Peralta MD        Or    ondansetron (ZOFRAN) injection 4 mg  4 mg Intravenous Q6H PRN Samantha Peralta MD        prochlorperazine (COMPAZINE) injection 5 mg  5 mg Intravenous Q6H PRN Samantha Peralta MD        Or    prochlorperazine (COMPAZINE) tablet 5 mg  5 mg Oral Q6H PRN Samantha Peralta MD        Or    prochlorperazine (COMPAZINE) suppository 12.5 mg  12.5 mg Rectal Q12H PRN Samantha Peralta MD        senna-docusate (SENOKOT-S/PERICOLACE) 8.6-50 MG per tablet 1 tablet  1 tablet Oral BID PRN Samantha Peralta MD        Or    senna-docusate (SENOKOT-S/PERICOLACE) 8.6-50 MG per tablet 2 tablet  2 tablet Oral BID PRN Samantha Peralta MD        sodium chloride (PF) 0.9% PF flush 3 mL  3 mL Intracatheter q1 min prn Samantha Peralta MD   3 mL at 24 1612            Physical Exam:   Temp:  [97.1  F (36.2  C)-98.1  F (36.7  C)] 98.1  F (36.7  C)  Pulse:  [51-63] 51  Resp:  [16-19] 18  BP: (129-165)/(57-73) 145/62  SpO2:  [94 %-97 %] 96 %    Intake/Output Summary (Last 24 hours) at 2024 1124  Last data filed at 2024 1612  Gross per 24 hour   Intake 803 ml   Output --   Net 803 ml     Temp:  [97.1  F (36.2  C)-98.1  F (36.7  C)] 98.1  F (36.7  C)  Pulse:  [51-63] 51  Resp:  [16-19] 18  BP: (129-165)/(57-73) 145/62  SpO2:  [94 %-97 %] 96 %    Intake/Output Summary (Last 24 hours) at 2024 1124  Last data filed at 2024 1612  Gross per 24 hour   Intake 803 ml   Output --   Net 803 ml     Temp  Av.8  F (37.1  C)  Min: 97.1  F (36.2  C)  Max: 100.1  F (37.8  C)      Pulse  Av.2  Min: 51  Max: 100 Resp  Av.8  Min: 9  Max: 26  SpO2  Av.7 %  Min: 85 %  Max: 98 %         Intake/Output Summary (Last 24 hours) at 2024  1124  Last data filed at 4/4/2024 1612  Gross per 24 hour   Intake 803 ml   Output --   Net 803 ml     Wt Readings from Last 4 Encounters:   04/05/24 75.4 kg (166 lb 4.8 oz)   07/25/23 73.9 kg (163 lb)       Constitutional:   Awake, alert and in no apparent distress     Eyes:   nonicteric     ENT:    oral mucosa moist without lesions     Neck:   Supple without supraclavicular or cervical lymphadenopathy     Lungs:   Good air entry bilaterally      Cardiovascular:   Normal S1 and S2.  RRR.  No murmur, gallop or rub.     Abdomen:   NABS, soft, nontender, nondistended.  No HSM.     Musculoskeletal:   No edema     Neurologic:   Alert and conversant.     Skin:   Warm, dry.  No rash on limited exam.             Current Laboratory Data:   All laboratory and imaging data reviewed.    Results for orders placed or performed during the hospital encounter of 03/30/24 (from the past 24 hour(s))   Platelet count   Result Value Ref Range    Platelet Count 224 150 - 450 10e3/uL   Basic metabolic panel   Result Value Ref Range    Sodium 137 135 - 145 mmol/L    Potassium 3.7 3.4 - 5.3 mmol/L    Chloride 94 (L) 98 - 107 mmol/L    Carbon Dioxide (CO2) 30 (H) 22 - 29 mmol/L    Anion Gap 13 7 - 15 mmol/L    Urea Nitrogen 42.4 (H) 8.0 - 23.0 mg/dL    Creatinine 1.27 (H) 0.51 - 0.95 mg/dL    GFR Estimate 46 (L) >60 mL/min/1.73m2    Calcium 9.2 8.8 - 10.2 mg/dL    Glucose 125 (H) 70 - 99 mg/dL

## 2024-04-05 NOTE — PROGRESS NOTES
Bronchoscopy complete without complication. Airway normal, specimen sent to lab  Okay to discharge today

## 2024-04-05 NOTE — DISCHARGE SUMMARY
"[facility]  Discharge Summary    Admit date:  3/30/2024    Discharge date and time: 4/5/2024    Discharge Physician: Adria Conner MD    Primary care provider: [place], [facility]    Primary Discharge Diagnosis      Acute Hypoxic Respiratory Failure     Secondary Diagnoses     Community-Acquired Pneumonia  COPD exacerbation  Chronic cough and Post-Nasal Drip  Dysphagia, Suspected to be of Esophageal Phase  Acute Kidney Injury  Essential HTN  Hyperlipidemia  Acute Kidney Injury    Summary of Hospital Stay     68F with hx of postnasal drip, chronic cough, hypertension, and dyslipidemia who presented with shortness of breath and worsening cough and found to have acute hypoxic resp failure suspected to be due to multifocal pneumonia and undiagnosed COPD, although underlying cause somewhat unclear. Underwent extensive testing including CTA imaging, TTE, and bronchoscopy. CT had evidence of multifocal pneumonia although minimal clinical signs of pneumonia. Completed a 5d course of antibiotics. TTE wnl but pulmonology suspecting some degree of volume overload and recommend a loop diuretic at time of discharge. Bronchoscopy was unrevealing, specimens sent to the lab and will be reviewed with patient at time of follow-up.    -Patient will be discharged with LABA/ICS, MARJORIE, prednisone taper, and lasix as recommended by pulmonology   -She will follow-up with pulmonology after the hospital stay  -Needs BMP in 1w by PCP  -Qualified for 2L O2 with exertion and this will be set up for patient  -Patient also being worked up for dysphagia thought to be of esophageal phase, referred her to GI for this    Patient Discharge Condition & Exam     Discharge condition: Improved    BP (!) 168/73 (BP Location: Right arm, Patient Position: Sitting)   Pulse 59   Temp 98  F (36.7  C) (Temporal)   Resp 18   Ht 1.702 m (5' 7\")   Wt 75.4 kg (166 lb 4.8 oz)   SpO2 92%   BMI 26.05 kg/m       General: In NAD.  Cardiac: " RRR.  Lungs: CTAB.  Abd: Non-tender.  Ext: No edema.    Discharge Instructions     Patient/family instructions: Written discharge instruction given to patient/family    Discharge Medications:     Review of your medicines        START taking        Dose / Directions   albuterol 108 (90 Base) MCG/ACT inhaler  Commonly known as: PROAIR HFA/PROVENTIL HFA/VENTOLIN HFA  Used for: Chronic obstructive pulmonary disease, unspecified COPD type (H)      Dose: 2 puff  Inhale 2 puffs into the lungs every 6 hours as needed for shortness of breath, wheezing or cough  Quantity: 18 g  Refills: 6     furosemide 20 MG tablet  Commonly known as: LASIX  Used for: GILES (dyspnea on exertion)      Dose: 20 mg  Take 1 tablet (20 mg) by mouth Every Mon, Wed, Fri Morning  Quantity: 30 tablet  Refills: 0     predniSONE 10 MG tablet  Commonly known as: DELTASONE  Used for: Chronic obstructive pulmonary disease, unspecified COPD type (H)      Dose: 10 mg  Take 1 tablet (10 mg) by mouth daily  Quantity: 200 tablet  Refills: 0            CONTINUE these medicines which have NOT CHANGED        Dose / Directions   atenolol 25 MG tablet  Commonly known as: TENORMIN      Dose: 25 mg  Take 25 mg by mouth 2 times daily  Refills: 0     atorvastatin 10 MG tablet  Commonly known as: LIPITOR      Dose: 10 mg  Take 10 mg by mouth daily  Refills: 0     fluticasone 50 MCG/ACT nasal spray  Commonly known as: FLONASE      Dose: 2 spray  Spray 2 sprays into both nostrils daily  Refills: 0     fluticasone-salmeterol 113-14 MCG/ACT inhaler  Commonly known as: AIRDUO RESPICLICK      Dose: 1 puff  Inhale 1 puff into the lungs 2 times daily  Refills: 0     losartan-hydrochlorothiazide 50-12.5 MG tablet  Commonly known as: HYZAAR      Dose: 1 tablet  Take 1 tablet by mouth daily  Refills: 0     omeprazole 20 MG DR capsule  Commonly known as: PriLOSEC      Dose: 20 mg  Take 20 mg by mouth daily  Refills: 0               Where to get your medicines        These medications  were sent to [facility]/pharm[facility] #[id number] - [place], [place] [address]  [address] [address] [address]      Phone: [phone]   albuterol 108 (90 Base) MCG/ACT inhaler  furosemide 20 MG tablet  predniSONE 10 MG tablet        Discharge diet: regular diet    Discharge activity: Activity as tolerated    Discharge follow-up:    Follow up with primary care provider within one week or earlier if symptoms return or gets worse.    Follow up with consultant as instructed.    Pending Results     Unresulted Labs Ordered in the Past 30 Days of this Admission       Date and Time Order Name Status Description    4/5/2024  1:48 PM Cell Count Body Fluid In process     4/5/2024  1:48 PM Acid-Fast Bacilli Culture and Stain In process     4/5/2024  1:48 PM Respiratory Aerobic Bacterial Culture In process     4/5/2024  1:48 PM Lymphocyte Subset Bronchial In process     4/5/2024  1:48 PM Fungal or Yeast Culture Routine In process     4/5/2024  1:48 PM ADELITA Preparation In process     4/5/2024  1:48 PM Gram Stain In process     4/5/2024  1:48 PM Acid-Fast Bacilli Culture and Stain In process              Patient Allergies   No Known Allergies    Disposition   Disposition: home     I saw and evaluated the patient on day of discharge and  discharge instructions reviewed  and  all the patient's questions and concerns addressed. Over 30 minutes spent on discharge and coordination of discharge process for this patient.

## 2024-04-05 NOTE — PROGRESS NOTES
AVS reviewed with patient and spouse. Patient is in stable condition, VSS, no co pain/cp/sob. All discharge education reviewed with pt in regards to: diet, activity, safety, s/s to report, medications and rx, follow up appointments/care.  All questions answered. Pt denies any further questions or concerns. PIV removed. No complications.  All belongings returned.

## 2024-04-05 NOTE — PROGRESS NOTES
Patient has been assessed for Home Oxygen needs. Oxygen readings:    *Pulse oximetry (SpO2) = 84% on room air at rest while awake.    *SpO2 improved to 93% on 2liters/minute at rest.    *SpO2 = 84% on room air during activity/with exercise.    *SpO2 improved to 95% on 2liters/minute during activity/with exercise.

## 2024-04-05 NOTE — PLAN OF CARE
"Goal Outcome Evaluation:      Plan of Care Reviewed With: patient    Overall Patient Progress: improvingOverall Patient Progress: improving    Outcome Evaluation: No complaints over night. Pt NPO for EGD this AM.      Problem: Adult Inpatient Plan of Care  Goal: Plan of Care Review  Description: The Plan of Care Review/Shift note should be completed every shift.  The Outcome Evaluation is a brief statement about your assessment that the patient is improving, declining, or no change.  This information will be displayed automatically on your shift  note.  Outcome: Progressing  Flowsheets (Taken 4/5/2024 0446)  Outcome Evaluation: No complaints over night. Pt NPO for EGD this AM.  Plan of Care Reviewed With: patient  Overall Patient Progress: improving  Goal: Patient-Specific Goal (Individualized)  Description: You can add care plan individualizations to a care plan. Examples of Individualization might be:  \"Parent requests to be called daily at 9am for status\", \"I have a hard time hearing out of my right ear\", or \"Do not touch me to wake me up as it startles  me\".  Outcome: Progressing  Goal: Absence of Hospital-Acquired Illness or Injury  Outcome: Progressing  Intervention: Identify and Manage Fall Risk  Recent Flowsheet Documentation  Taken 4/4/2024 2029 by Keyona Esparza RN  Safety Promotion/Fall Prevention: safety round/check completed  Intervention: Prevent Skin Injury  Recent Flowsheet Documentation  Taken 4/4/2024 2029 by Keyona Esparza RN  Body Position: position changed independently  Intervention: Prevent and Manage VTE (Venous Thromboembolism) Risk  Recent Flowsheet Documentation  Taken 4/4/2024 2029 by Keyona Esparza RN  VTE Prevention/Management: (ambulatory) SCDs (sequential compression devices) off  Goal: Optimal Comfort and Wellbeing  Outcome: Progressing  Goal: Readiness for Transition of Care  Outcome: Progressing         "

## 2024-04-05 NOTE — PROGRESS NOTES
"CLINICAL NUTRITION SERVICES  -  ASSESSMENT NOTE    Recommendations Ordered by Registered Dietitian (RD):   Diet per MD  - unable to order oral nutritional supplements at this time given current diet order. NPO for procedure.    Malnutrition:   % Weight Loss:  Weight loss does not meet criteria for malnutrition  % Intake:  Decreased intake does not meet criteria for malnutrition - during admit, unable to assess PTA   Subcutaneous Fat Loss:  unable to assess - pt in procedure   Muscle Loss:  unable to assess - pt in procedure   Fluid Retention:  None noted    Malnutrition Diagnosis: Unable to determine due to lack of information in the chart       REASON FOR ASSESSMENT  Lydia Johnson is a 68 year old female seen by Registered Dietitian for LOS    Past medical history: chronic cough, osteopenia, hypertension, dyslipidemia.     Admitted for:   Acute hypoxemic respiratory failure   Community-acquired pneumonia     NUTRITION HISTORY  - Information obtained from chart - pt down for a procedure   - Food allergies: NKFA    CURRENT NUTRITION ORDERS  Diet Order:     NPO     Current Intake/Tolerance:  Previously on a regular diet - upon review of the flowsheets, pt is consuming % of meals ordered. Ordering 2-3 meals per day.      Obtained from Chart/Interdisciplinary Team:  - SLP following   - Pulmonology following - bronch today   - GI following   - Reviewed stooling patterns  - Please refer to flowsheets for more information on skin     ANTHROPOMETRICS  Height: 5' 7\"  Weight: 75.4 kg ( 166 lbs 4.8 oz)   Body mass index is 26.05 kg/m .  Weight Status:  Overweight BMI 25-29.9  Weight History: weight loss of 2.6 kg (3.3%) over the past ~2.5 months   Wt Readings from Last 10 Encounters:   04/05/24 75.4 kg (166 lb 4.8 oz)   07/25/23 73.9 kg (163 lb)     Per care everywhere:    72.1 kg (159 lb) 05/08/2023 4:28 PM CDT pt reported     73.5 kg (162 lb) 09/11/2023 9:35 AM CDT       78 kg (172 lb) 01/31/2024 11:00 AM CST " "    77.1 kg (170 lb) 03/12/2024 10:21 AM CDT     LABS  Labs reviewed    Labs:  Electrolytes  Potassium (mmol/L)   Date Value   04/05/2024 3.7   04/04/2024 4.0   03/31/2024 3.4   10/14/2021 2.8 (L)    Blood Glucose  Glucose (mg/dL)   Date Value   04/05/2024 125 (H)   04/04/2024 125 (H)   03/31/2024 117 (H)   03/30/2024 119 (H)   07/25/2023 114 (H)   10/14/2021 132 (H)    Inflammatory Markers  WBC Count (10e3/uL)   Date Value   04/03/2024 11.7 (H)   03/31/2024 10.2   03/30/2024 19.8 (H)     Albumin (g/dL)   Date Value   03/31/2024 3.3 (L)   03/30/2024 4.0      Magnesium (mg/dL)   Date Value   04/04/2024 2.2   07/25/2023 1.9     Sodium (mmol/L)   Date Value   04/05/2024 137   04/04/2024 139   03/31/2024 138    Renal  Urea Nitrogen (mg/dL)   Date Value   04/05/2024 42.4 (H)   04/04/2024 35.9 (H)   03/31/2024 13.0   10/14/2021 12     Creatinine (mg/dL)   Date Value   04/05/2024 1.27 (H)   04/04/2024 1.06 (H)   04/02/2024 0.70     Additional  No results found for: \"TRIG\", \"URINEKETONE\"     MEDICATIONS  Medications reviewed    Current Facility-Administered Medications   Medication Dose Route Frequency Provider Last Rate Last Admin    atenolol (TENORMIN) tablet 25 mg  25 mg Oral BID Olya, Shaw A, DO   25 mg at 04/05/24 0848    atorvastatin (LIPITOR) tablet 10 mg  10 mg Oral Daily Olya, Shaw A, DO   10 mg at 04/05/24 0848    azelastine (ASTELIN) nasal spray 2 spray  2 spray Both Nostrils BID Harshad Stevens MD   2 spray at 04/05/24 1114    [Held by provider] enoxaparin ANTICOAGULANT (LOVENOX) injection 40 mg  40 mg Subcutaneous Q24H Samantha Peralta MD   40 mg at 04/04/24 0923    fluticasone (FLONASE) 50 MCG/ACT spray 2 spray  2 spray Both Nostrils Daily Shaw Dobson DO   2 spray at 04/05/24 1114    formoterol (PERFOROMIST) neb solution 20 mcg  20 mcg Nebulization Q12H Jake Sanchez MD   20 mcg at 04/05/24 0806    ipratropium - albuterol 0.5 mg/2.5 mg/3 mL (DUONEB) neb solution 3 mL  3 mL " Nebulization 4x daily Jake Sanchez MD   3 mL at 04/05/24 1156    losartan-hydrochlorothiazide (HYZAAR) 50-12.5 MG per tablet 1 tablet  1 tablet Oral Daily Olya, Shaw A, DO   1 tablet at 04/05/24 0856    methylPREDNISolone sodium succinate (SOLU-MEDROL) injection 40 mg  40 mg Intravenous Q8H Jake Sanchez MD   40 mg at 04/05/24 0848    pantoprazole (PROTONIX) EC tablet 40 mg  40 mg Oral Daily Olya, Shaw A, DO   40 mg at 04/05/24 0848    sodium chloride (PF) 0.9% PF flush 3 mL  3 mL Intracatheter Q8H Samantha Peralta MD   3 mL at 04/05/24 0849      Current Facility-Administered Medications   Medication Dose Route Frequency Provider Last Rate Last Admin      Current Facility-Administered Medications   Medication Dose Route Frequency Provider Last Rate Last Admin    acetaminophen (TYLENOL) tablet 650 mg  650 mg Oral Q4H PRN Samantha Peralta MD        Or    acetaminophen (TYLENOL) Suppository 650 mg  650 mg Rectal Q4H PRN Samantha Peralta MD        calcium carbonate (TUMS) chewable tablet 1,000 mg  1,000 mg Oral 4x Daily PRN Samantha Peralta MD        ipratropium - albuterol 0.5 mg/2.5 mg/3 mL (DUONEB) neb solution 3 mL  3 mL Nebulization 4x Daily PRN Samantha Peralta MD        levalbuterol (XOPENEX) neb solution 1.25 mg  1.25 mg Nebulization Q4H PRN Jake Sanchez MD        lidocaine (LMX4) cream   Topical Q1H PRN Samantha Peralta MD        lidocaine 1 % 0.1-1 mL  0.1-1 mL Other Q1H PRN Samantha Peralta MD        ondansetron (ZOFRAN ODT) ODT tab 4 mg  4 mg Oral Q6H PRN Samantha Peralta MD        Or    ondansetron (ZOFRAN) injection 4 mg  4 mg Intravenous Q6H PRN Samantha Peralta MD        prochlorperazine (COMPAZINE) injection 5 mg  5 mg Intravenous Q6H PRN Samantha Peralta MD        Or    prochlorperazine (COMPAZINE) tablet 5 mg  5 mg Oral Q6H PRN Samantha Peralta MD        Or    prochlorperazine (COMPAZINE) suppository 12.5 mg  12.5 mg Rectal Q12H PRN Samantha Peralta MD        senna-docusate (SENOKOT-S/PERICOLACE)  8.6-50 MG per tablet 1 tablet  1 tablet Oral BID PRN Samantha Peralta MD        Or    senna-docusate (SENOKOT-S/PERICOLACE) 8.6-50 MG per tablet 2 tablet  2 tablet Oral BID PRN Samantha Peralta MD        sodium chloride (PF) 0.9% PF flush 3 mL  3 mL Intracatheter q1 min prn Samantha Peralta MD   3 mL at 04/04/24 1612        ASSESSED NUTRITION NEEDS PER APPROVED PRACTICE GUIDELINES:    Dosing Weight 75.4 kg   Estimated Energy Needs: 8357-0222 kcals (25-30 Kcal/Kg)  Justification: maintenance  Estimated Protein Needs: 75-90 grams protein (1-1.2 g pro/Kg)  Justification: maintenance  Estimated Fluid Needs: per MD     NUTRITION DIAGNOSIS:  Predicted inadequate nutrient intake related to potential for PO intake to decline pending clinical course     NUTRITION INTERVENTIONS  Recommendations / Nutrition Prescription  Diet per MD  - unable to order oral nutritional supplements at this time given current diet order. NPO for procedure.     Implementation  Nutrition education: Not appropriate at this time due to patient condition    Nutrition Goals  Patient to consume 75% of meals or oral nutritional supplements ordered TID    MONITORING AND EVALUATION:  Progress towards goals will be monitored and evaluated per protocol and Practice Guidelines    Riddhi Evans RD, LD  Clinical Dietitian     3rd floor/ICU: [phone]  All other floors: [phone]  Weekend/holiday: [phone]  Office: [phone]

## 2024-04-06 NOTE — PLAN OF CARE
Speech Language Therapy Discharge Summary    Reason for therapy discharge:    Discharged to home.    Progress towards therapy goal(s). See goals on Care Plan in [facility] electronic health record for goal details.  Goals met    Therapy recommendation(s):    No further therapy is recommended. Per [facility] 4/2/24, consider workup for esophageal dysphagia as OP. Diet/precautions: Regular/thin when fully upright, remain upright 30-60 minutes after PO, slow rate, alternate between solids/liquids every 1-3 bites.

## 2024-04-07 LAB
% LINING CELLS, BODY FLUID: 10 %
APPEARANCE FLD: CLEAR
BACTERIA BRONCH: NO GROWTH
CELL COUNT BODY FLUID SOURCE: NORMAL
COLOR FLD: COLORLESS
EOSINOPHIL NFR FLD MANUAL: 4 %
LYMPHOCYTES NFR FLD MANUAL: 57 %
MONOS+MACROS NFR FLD MANUAL: 9 %
NEUTS BAND NFR FLD MANUAL: 20 %
RBC # FLD: 13 /UL
WBC # FLD AUTO: 14 /UL

## 2024-05-03 LAB — BACTERIA BRONCH: NO GROWTH

## 2024-05-26 ENCOUNTER — HEALTH MAINTENANCE LETTER (OUTPATIENT)
Age: 69
End: 2024-05-26

## 2024-05-31 LAB
ACID FAST STAIN (ARUP): NORMAL

## 2025-06-14 ENCOUNTER — HEALTH MAINTENANCE LETTER (OUTPATIENT)
Age: 70
End: 2025-06-14

## (undated) RX ORDER — LIDOCAINE HYDROCHLORIDE 20 MG/ML
JELLY TOPICAL
Status: DISPENSED
Start: 2024-04-05

## (undated) RX ORDER — FENTANYL CITRATE 50 UG/ML
INJECTION, SOLUTION INTRAMUSCULAR; INTRAVENOUS
Status: DISPENSED
Start: 2024-04-05

## (undated) RX ORDER — ALBUTEROL SULFATE 0.83 MG/ML
SOLUTION RESPIRATORY (INHALATION)
Status: DISPENSED
Start: 2024-04-05

## (undated) RX ORDER — LIDOCAINE HYDROCHLORIDE 40 MG/ML
INJECTION, SOLUTION RETROBULBAR
Status: DISPENSED
Start: 2024-04-05

## (undated) RX ORDER — LIDOCAINE HYDROCHLORIDE 10 MG/ML
INJECTION, SOLUTION EPIDURAL; INFILTRATION; INTRACAUDAL; PERINEURAL
Status: DISPENSED
Start: 2024-04-05